# Patient Record
Sex: FEMALE | Race: OTHER | NOT HISPANIC OR LATINO
[De-identification: names, ages, dates, MRNs, and addresses within clinical notes are randomized per-mention and may not be internally consistent; named-entity substitution may affect disease eponyms.]

---

## 2017-02-09 ENCOUNTER — APPOINTMENT (OUTPATIENT)
Dept: OBGYN | Facility: CLINIC | Age: 57
End: 2017-02-09

## 2017-03-31 ENCOUNTER — APPOINTMENT (OUTPATIENT)
Dept: MAMMOGRAPHY | Facility: IMAGING CENTER | Age: 57
End: 2017-03-31

## 2017-03-31 ENCOUNTER — APPOINTMENT (OUTPATIENT)
Dept: ULTRASOUND IMAGING | Facility: IMAGING CENTER | Age: 57
End: 2017-03-31

## 2017-03-31 ENCOUNTER — OUTPATIENT (OUTPATIENT)
Dept: OUTPATIENT SERVICES | Facility: HOSPITAL | Age: 57
LOS: 1 days | End: 2017-03-31
Payer: MEDICAID

## 2017-03-31 ENCOUNTER — APPOINTMENT (OUTPATIENT)
Dept: MRI IMAGING | Facility: IMAGING CENTER | Age: 57
End: 2017-03-31

## 2017-03-31 DIAGNOSIS — Z00.00 ENCOUNTER FOR GENERAL ADULT MEDICAL EXAMINATION WITHOUT ABNORMAL FINDINGS: ICD-10-CM

## 2017-03-31 PROCEDURE — 77066 DX MAMMO INCL CAD BI: CPT

## 2017-03-31 PROCEDURE — C8937: CPT

## 2017-03-31 PROCEDURE — A9585: CPT

## 2017-03-31 PROCEDURE — 76641 ULTRASOUND BREAST COMPLETE: CPT

## 2017-03-31 PROCEDURE — C8908: CPT

## 2017-03-31 PROCEDURE — G0279: CPT

## 2017-04-06 DIAGNOSIS — N60.02 SOLITARY CYST OF LEFT BREAST: ICD-10-CM

## 2017-04-06 DIAGNOSIS — Z80.3 FAMILY HISTORY OF MALIGNANT NEOPLASM OF BREAST: ICD-10-CM

## 2017-04-06 DIAGNOSIS — N63 UNSPECIFIED LUMP IN BREAST: ICD-10-CM

## 2017-04-06 DIAGNOSIS — R92.1 MAMMOGRAPHIC CALCIFICATION FOUND ON DIAGNOSTIC IMAGING OF BREAST: ICD-10-CM

## 2017-04-06 DIAGNOSIS — C50.911 MALIGNANT NEOPLASM OF UNSPECIFIED SITE OF RIGHT FEMALE BREAST: ICD-10-CM

## 2017-04-13 ENCOUNTER — RESULT REVIEW (OUTPATIENT)
Age: 57
End: 2017-04-13

## 2017-04-14 ENCOUNTER — APPOINTMENT (OUTPATIENT)
Dept: MRI IMAGING | Facility: IMAGING CENTER | Age: 57
End: 2017-04-14

## 2017-04-14 ENCOUNTER — OUTPATIENT (OUTPATIENT)
Dept: OUTPATIENT SERVICES | Facility: HOSPITAL | Age: 57
LOS: 1 days | End: 2017-04-14
Payer: MEDICAID

## 2017-04-14 DIAGNOSIS — Z00.8 ENCOUNTER FOR OTHER GENERAL EXAMINATION: ICD-10-CM

## 2017-04-14 PROCEDURE — 88305 TISSUE EXAM BY PATHOLOGIST: CPT

## 2017-04-14 PROCEDURE — A9585: CPT

## 2017-04-14 PROCEDURE — 88360 TUMOR IMMUNOHISTOCHEM/MANUAL: CPT

## 2017-04-14 PROCEDURE — 77065 DX MAMMO INCL CAD UNI: CPT

## 2017-04-14 PROCEDURE — 19085 BX BREAST 1ST LESION MR IMAG: CPT

## 2017-04-17 LAB — SURGICAL PATHOLOGY STUDY: SIGNIFICANT CHANGE UP

## 2017-05-18 ENCOUNTER — APPOINTMENT (OUTPATIENT)
Dept: MAMMOGRAPHY | Facility: IMAGING CENTER | Age: 57
End: 2017-05-18

## 2017-05-18 ENCOUNTER — OUTPATIENT (OUTPATIENT)
Dept: OUTPATIENT SERVICES | Facility: HOSPITAL | Age: 57
LOS: 1 days | End: 2017-05-18
Payer: MEDICAID

## 2017-05-18 VITALS
HEIGHT: 65 IN | TEMPERATURE: 98 F | WEIGHT: 132.94 LBS | DIASTOLIC BLOOD PRESSURE: 61 MMHG | OXYGEN SATURATION: 100 % | HEART RATE: 60 BPM | SYSTOLIC BLOOD PRESSURE: 119 MMHG | RESPIRATION RATE: 16 BRPM

## 2017-05-18 DIAGNOSIS — Z98.890 OTHER SPECIFIED POSTPROCEDURAL STATES: Chronic | ICD-10-CM

## 2017-05-18 DIAGNOSIS — C50.919 MALIGNANT NEOPLASM OF UNSPECIFIED SITE OF UNSPECIFIED FEMALE BREAST: ICD-10-CM

## 2017-05-18 DIAGNOSIS — Z00.8 ENCOUNTER FOR OTHER GENERAL EXAMINATION: ICD-10-CM

## 2017-05-18 DIAGNOSIS — C50.911 MALIGNANT NEOPLASM OF UNSPECIFIED SITE OF RIGHT FEMALE BREAST: ICD-10-CM

## 2017-05-18 DIAGNOSIS — Z01.818 ENCOUNTER FOR OTHER PREPROCEDURAL EXAMINATION: ICD-10-CM

## 2017-05-18 PROCEDURE — 85610 PROTHROMBIN TIME: CPT

## 2017-05-18 PROCEDURE — C1739: CPT

## 2017-05-18 PROCEDURE — 19281 PERQ DEVICE BREAST 1ST IMAG: CPT

## 2017-05-18 PROCEDURE — 85027 COMPLETE CBC AUTOMATED: CPT

## 2017-05-18 PROCEDURE — 19282 PERQ DEVICE BREAST EA IMAG: CPT

## 2017-05-18 PROCEDURE — 80053 COMPREHEN METABOLIC PANEL: CPT

## 2017-05-18 NOTE — H&P PST ADULT - PMH
Anemia, unspecified type    Arthritis    BRCA gene mutation negative    Chronic back pain greater than 3 months duration    GERD without esophagitis    Hepatomegaly  dx 1989  monitoring now 'OKAY"  Malignant neoplasm of right female breast, unspecified site of breast  s/p biopsy  Tinnitus aurium, right Anemia, unspecified type    Arthritis    BRCA gene mutation negative    Chronic back pain greater than 3 months duration    Enlarged heart    GERD without esophagitis    Hepatomegaly  dx 1989  monitoring now 'OKAY"  Malignant neoplasm of right female breast, unspecified site of breast  s/p biopsy  Tinnitus aurium, right

## 2017-05-18 NOTE — H&P PST ADULT - FAMILY HISTORY
Mother  Still living? No  Family history of breast cancer, Age at diagnosis: 51-60     Father  Still living? No  Family history of cerebrovascular accident (CVA), Age at diagnosis: 81-90     Aunt  Still living? No  Family history of breast cancer, Age at diagnosis: Age Unknown

## 2017-05-18 NOTE — H&P PST ADULT - NSANTHOSAYNRD_GEN_A_CORE
No. BING screening performed.  STOP BANG Legend: 0-2 = LOW Risk; 3-4 = INTERMEDIATE Risk; 5-8 = HIGH Risk

## 2017-05-24 ENCOUNTER — RESULT REVIEW (OUTPATIENT)
Age: 57
End: 2017-05-24

## 2017-05-24 ENCOUNTER — OUTPATIENT (OUTPATIENT)
Dept: OUTPATIENT SERVICES | Facility: HOSPITAL | Age: 57
LOS: 1 days | End: 2017-05-24
Payer: MEDICAID

## 2017-05-24 ENCOUNTER — APPOINTMENT (OUTPATIENT)
Dept: NUCLEAR MEDICINE | Facility: HOSPITAL | Age: 57
End: 2017-05-24

## 2017-05-24 VITALS
DIASTOLIC BLOOD PRESSURE: 78 MMHG | HEART RATE: 70 BPM | WEIGHT: 132.94 LBS | TEMPERATURE: 99 F | OXYGEN SATURATION: 100 % | RESPIRATION RATE: 16 BRPM | HEIGHT: 65 IN | SYSTOLIC BLOOD PRESSURE: 132 MMHG

## 2017-05-24 VITALS
TEMPERATURE: 98 F | SYSTOLIC BLOOD PRESSURE: 133 MMHG | HEART RATE: 74 BPM | RESPIRATION RATE: 16 BRPM | DIASTOLIC BLOOD PRESSURE: 69 MMHG | OXYGEN SATURATION: 100 %

## 2017-05-24 DIAGNOSIS — Z98.890 OTHER SPECIFIED POSTPROCEDURAL STATES: Chronic | ICD-10-CM

## 2017-05-24 DIAGNOSIS — C50.919 MALIGNANT NEOPLASM OF UNSPECIFIED SITE OF UNSPECIFIED FEMALE BREAST: ICD-10-CM

## 2017-05-24 PROCEDURE — A9541: CPT

## 2017-05-24 PROCEDURE — 88307 TISSUE EXAM BY PATHOLOGIST: CPT | Mod: 26

## 2017-05-24 PROCEDURE — 76098 X-RAY EXAM SURGICAL SPECIMEN: CPT

## 2017-05-24 PROCEDURE — C1889: CPT

## 2017-05-24 PROCEDURE — 19302 P-MASTECTOMY W/LN REMOVAL: CPT | Mod: RT

## 2017-05-24 PROCEDURE — 88307 TISSUE EXAM BY PATHOLOGIST: CPT

## 2017-05-24 PROCEDURE — 76098 X-RAY EXAM SURGICAL SPECIMEN: CPT | Mod: 26

## 2017-05-24 RX ORDER — ACETAMINOPHEN 500 MG
1000 TABLET ORAL ONCE
Qty: 0 | Refills: 0 | Status: DISCONTINUED | OUTPATIENT
Start: 2017-05-24 | End: 2017-06-08

## 2017-05-24 RX ORDER — ONDANSETRON 8 MG/1
4 TABLET, FILM COATED ORAL ONCE
Qty: 0 | Refills: 0 | Status: DISCONTINUED | OUTPATIENT
Start: 2017-05-24 | End: 2017-06-08

## 2017-05-24 RX ORDER — SODIUM CHLORIDE 9 MG/ML
3 INJECTION INTRAMUSCULAR; INTRAVENOUS; SUBCUTANEOUS EVERY 8 HOURS
Qty: 0 | Refills: 0 | Status: DISCONTINUED | OUTPATIENT
Start: 2017-05-24 | End: 2017-05-24

## 2017-05-24 RX ORDER — OXYCODONE HYDROCHLORIDE 5 MG/1
10 TABLET ORAL ONCE
Qty: 0 | Refills: 0 | Status: DISCONTINUED | OUTPATIENT
Start: 2017-05-24 | End: 2017-05-24

## 2017-05-24 RX ORDER — SODIUM CHLORIDE 9 MG/ML
1000 INJECTION, SOLUTION INTRAVENOUS
Qty: 0 | Refills: 0 | Status: DISCONTINUED | OUTPATIENT
Start: 2017-05-24 | End: 2017-06-08

## 2017-05-24 NOTE — ASU PATIENT PROFILE, ADULT - PMH
Anemia, unspecified type    Arthritis    BRCA gene mutation negative    Chronic back pain greater than 3 months duration    Enlarged heart    GERD without esophagitis    Hepatomegaly  dx 1989  monitoring now 'OKAY"  Malignant neoplasm of right female breast, unspecified site of breast  s/p biopsy  Tinnitus aurium, right

## 2017-05-24 NOTE — ASU DISCHARGE PLAN (ADULT/PEDIATRIC). - NOTIFY
Fever greater than 101/Swelling that continues/Bleeding that does not stop/Numbness, color, or temperature change to extremity Bleeding that does not stop/Fever greater than 101/Pain not relieved by Medications/Swelling that continues/Numbness, color, or temperature change to extremity

## 2017-05-24 NOTE — ASU DISCHARGE PLAN (ADULT/PEDIATRIC). - MEDICATION SUMMARY - MEDICATIONS TO TAKE
I will START or STAY ON the medications listed below when I get home from the hospital:    iron  --  by mouth once a day usues liquid form  -- Indication: For Home medication    anastrozole 1 mg oral tablet  -- 1 tab(s) by mouth once a day  -- Indication: For Home medication    Pepcid 20 mg oral tablet  -- 1 tab(s) by mouth twice  -- Indication: For Home medication

## 2017-05-24 NOTE — ASU DISCHARGE PLAN (ADULT/PEDIATRIC). - SPECIAL INSTRUCTIONS
Some bruising is normal and will resolve in coming weeks.  It is also to be expected that there will be some bloody spotting from surgical incision. If you experience this, just place some helder gauze over the area and secure with paper tape.

## 2017-05-30 ENCOUNTER — TRANSCRIPTION ENCOUNTER (OUTPATIENT)
Age: 57
End: 2017-05-30

## 2017-05-30 LAB — SURGICAL PATHOLOGY STUDY: SIGNIFICANT CHANGE UP

## 2017-06-18 ENCOUNTER — OUTPATIENT (OUTPATIENT)
Dept: OUTPATIENT SERVICES | Facility: HOSPITAL | Age: 57
LOS: 1 days | End: 2017-06-18
Payer: MEDICAID

## 2017-06-18 ENCOUNTER — APPOINTMENT (OUTPATIENT)
Dept: MRI IMAGING | Facility: IMAGING CENTER | Age: 57
End: 2017-06-18

## 2017-06-18 DIAGNOSIS — Z98.890 OTHER SPECIFIED POSTPROCEDURAL STATES: Chronic | ICD-10-CM

## 2017-06-18 DIAGNOSIS — Z00.8 ENCOUNTER FOR OTHER GENERAL EXAMINATION: ICD-10-CM

## 2017-06-18 PROCEDURE — C8908: CPT

## 2017-06-18 PROCEDURE — A9585: CPT

## 2017-06-18 PROCEDURE — C8937: CPT

## 2017-06-27 ENCOUNTER — APPOINTMENT (OUTPATIENT)
Dept: MRI IMAGING | Facility: IMAGING CENTER | Age: 57
End: 2017-06-27

## 2017-06-27 ENCOUNTER — APPOINTMENT (OUTPATIENT)
Dept: ULTRASOUND IMAGING | Facility: IMAGING CENTER | Age: 57
End: 2017-06-27

## 2017-06-27 ENCOUNTER — OUTPATIENT (OUTPATIENT)
Dept: OUTPATIENT SERVICES | Facility: HOSPITAL | Age: 57
LOS: 1 days | End: 2017-06-27
Payer: MEDICAID

## 2017-06-27 ENCOUNTER — RESULT REVIEW (OUTPATIENT)
Age: 57
End: 2017-06-27

## 2017-06-27 DIAGNOSIS — Z98.890 OTHER SPECIFIED POSTPROCEDURAL STATES: Chronic | ICD-10-CM

## 2017-06-27 DIAGNOSIS — Z00.8 ENCOUNTER FOR OTHER GENERAL EXAMINATION: ICD-10-CM

## 2017-06-27 PROCEDURE — 77065 DX MAMMO INCL CAD UNI: CPT

## 2017-06-27 PROCEDURE — 88305 TISSUE EXAM BY PATHOLOGIST: CPT

## 2017-06-27 PROCEDURE — G0279: CPT

## 2017-06-27 PROCEDURE — 19085 BX BREAST 1ST LESION MR IMAG: CPT

## 2017-06-27 PROCEDURE — 76642 ULTRASOUND BREAST LIMITED: CPT

## 2017-06-27 PROCEDURE — A9585: CPT

## 2017-06-30 LAB — SURGICAL PATHOLOGY STUDY: SIGNIFICANT CHANGE UP

## 2017-07-05 DIAGNOSIS — R92.8 OTHER ABNORMAL AND INCONCLUSIVE FINDINGS ON DIAGNOSTIC IMAGING OF BREAST: ICD-10-CM

## 2017-07-05 DIAGNOSIS — C50.911 MALIGNANT NEOPLASM OF UNSPECIFIED SITE OF RIGHT FEMALE BREAST: ICD-10-CM

## 2017-07-05 DIAGNOSIS — Z85.3 PERSONAL HISTORY OF MALIGNANT NEOPLASM OF BREAST: ICD-10-CM

## 2017-07-05 DIAGNOSIS — N64.89 OTHER SPECIFIED DISORDERS OF BREAST: ICD-10-CM

## 2017-08-15 ENCOUNTER — OUTPATIENT (OUTPATIENT)
Dept: OUTPATIENT SERVICES | Facility: HOSPITAL | Age: 57
LOS: 1 days | End: 2017-08-15
Payer: MEDICAID

## 2017-08-15 VITALS
HEART RATE: 61 BPM | WEIGHT: 130.95 LBS | HEIGHT: 62 IN | TEMPERATURE: 99 F | DIASTOLIC BLOOD PRESSURE: 79 MMHG | OXYGEN SATURATION: 99 % | SYSTOLIC BLOOD PRESSURE: 123 MMHG | RESPIRATION RATE: 18 BRPM

## 2017-08-15 DIAGNOSIS — D64.9 ANEMIA, UNSPECIFIED: ICD-10-CM

## 2017-08-15 DIAGNOSIS — Z01.818 ENCOUNTER FOR OTHER PREPROCEDURAL EXAMINATION: ICD-10-CM

## 2017-08-15 DIAGNOSIS — Z90.11 ACQUIRED ABSENCE OF RIGHT BREAST AND NIPPLE: Chronic | ICD-10-CM

## 2017-08-15 DIAGNOSIS — Z85.3 PERSONAL HISTORY OF MALIGNANT NEOPLASM OF BREAST: ICD-10-CM

## 2017-08-15 DIAGNOSIS — Z98.890 OTHER SPECIFIED POSTPROCEDURAL STATES: Chronic | ICD-10-CM

## 2017-08-15 LAB
ANION GAP SERPL CALC-SCNC: 12 MMOL/L — SIGNIFICANT CHANGE UP (ref 5–17)
BUN SERPL-MCNC: 15 MG/DL — SIGNIFICANT CHANGE UP (ref 7–23)
CALCIUM SERPL-MCNC: 9.3 MG/DL — SIGNIFICANT CHANGE UP (ref 8.4–10.5)
CHLORIDE SERPL-SCNC: 104 MMOL/L — SIGNIFICANT CHANGE UP (ref 96–108)
CO2 SERPL-SCNC: 26 MMOL/L — SIGNIFICANT CHANGE UP (ref 22–31)
CREAT SERPL-MCNC: 0.7 MG/DL — SIGNIFICANT CHANGE UP (ref 0.5–1.3)
GLUCOSE SERPL-MCNC: 86 MG/DL — SIGNIFICANT CHANGE UP (ref 70–99)
HCT VFR BLD CALC: 31 % — LOW (ref 34.5–45)
HGB BLD-MCNC: 9.8 G/DL — LOW (ref 11.5–15.5)
MCHC RBC-ENTMCNC: 27.8 PG — SIGNIFICANT CHANGE UP (ref 27–34)
MCHC RBC-ENTMCNC: 31.6 GM/DL — LOW (ref 32–36)
MCV RBC AUTO: 88.1 FL — SIGNIFICANT CHANGE UP (ref 80–100)
PLATELET # BLD AUTO: 200 K/UL — SIGNIFICANT CHANGE UP (ref 150–400)
POTASSIUM SERPL-MCNC: 3.9 MMOL/L — SIGNIFICANT CHANGE UP (ref 3.5–5.3)
POTASSIUM SERPL-SCNC: 3.9 MMOL/L — SIGNIFICANT CHANGE UP (ref 3.5–5.3)
RBC # BLD: 3.52 M/UL — LOW (ref 3.8–5.2)
RBC # FLD: 12.7 % — SIGNIFICANT CHANGE UP (ref 10.3–14.5)
SODIUM SERPL-SCNC: 142 MMOL/L — SIGNIFICANT CHANGE UP (ref 135–145)
WBC # BLD: 4.64 K/UL — SIGNIFICANT CHANGE UP (ref 3.8–10.5)
WBC # FLD AUTO: 4.64 K/UL — SIGNIFICANT CHANGE UP (ref 3.8–10.5)

## 2017-08-15 PROCEDURE — 80048 BASIC METABOLIC PNL TOTAL CA: CPT

## 2017-08-15 PROCEDURE — 85027 COMPLETE CBC AUTOMATED: CPT

## 2017-08-15 PROCEDURE — G0463: CPT

## 2017-08-15 RX ORDER — LIDOCAINE HCL 20 MG/ML
0.2 VIAL (ML) INJECTION ONCE
Qty: 0 | Refills: 0 | Status: DISCONTINUED | OUTPATIENT
Start: 2017-08-25 | End: 2017-08-25

## 2017-08-15 RX ORDER — SODIUM CHLORIDE 9 MG/ML
3 INJECTION INTRAMUSCULAR; INTRAVENOUS; SUBCUTANEOUS EVERY 8 HOURS
Qty: 0 | Refills: 0 | Status: DISCONTINUED | OUTPATIENT
Start: 2017-08-25 | End: 2017-08-25

## 2017-08-15 NOTE — H&P PST ADULT - MALLAMPATI CLASS
Class III - visualization of the soft palate and the base of the uvula 13 inch neck/Class II - visualization of the soft palate, fauces, and uvula

## 2017-08-15 NOTE — H&P PST ADULT - PSH
S/P biopsy  right breast 2017  S/P endoscopy  many years ago  S/P right mastectomy  5/2017 S/P biopsy  right breast 2017  S/P endoscopy  many years ago  S/P lumpectomy, right breast  5/2017

## 2017-08-15 NOTE — H&P PST ADULT - HISTORY OF PRESENT ILLNESS
56 year old female with 56 year old female s/p right lumpectomy for breast CA in May/2017 .Pt reports that s/p recent mammogram F/U with MRI shows 6mm Non mass enhancement  in upper outer left breast. Now coming in PST  for scheduled  Bilateral simple mastectomies, Left Breast sentinel  Lymph node biopsy, Bilateral Breast Reconstruction with expanders on 8/25/2017.

## 2017-08-15 NOTE — H&P PST ADULT - PMH
Anemia, unspecified type    Arthritis    BRCA gene mutation negative    Chronic back pain greater than 3 months duration    Enlarged heart    GERD without esophagitis    Hepatomegaly  dx 1989  monitoring now 'OKAY"  Malignant neoplasm of right female breast, unspecified site of breast  s/p biopsy  Tinnitus aurium, right Anemia, unspecified type    Arthritis    BRCA gene mutation negative    Enlarged heart    GERD without esophagitis    Hepatomegaly  dx 1989  monitoring now 'OKAY"  Malignant neoplasm of right female breast, unspecified site of breast  s/p lumpectomy ( 5/ 2017 )  Migraine    Personal history of malignant neoplasm of breast    Tinnitus aurium, right

## 2017-08-16 RX ORDER — CEFAZOLIN SODIUM 1 G
2000 VIAL (EA) INJECTION ONCE
Qty: 0 | Refills: 0 | Status: DISCONTINUED | OUTPATIENT
Start: 2017-08-25 | End: 2017-08-25

## 2017-08-25 ENCOUNTER — RESULT REVIEW (OUTPATIENT)
Age: 57
End: 2017-08-25

## 2017-08-25 ENCOUNTER — INPATIENT (INPATIENT)
Facility: HOSPITAL | Age: 57
LOS: 1 days | Discharge: HOME HEALTH | DRG: 581 | End: 2017-08-27
Attending: SPECIALIST | Admitting: SPECIALIST
Payer: MEDICAID

## 2017-08-25 ENCOUNTER — APPOINTMENT (OUTPATIENT)
Dept: NUCLEAR MEDICINE | Facility: HOSPITAL | Age: 57
End: 2017-08-25

## 2017-08-25 VITALS
DIASTOLIC BLOOD PRESSURE: 84 MMHG | WEIGHT: 130.95 LBS | HEIGHT: 63 IN | HEART RATE: 70 BPM | OXYGEN SATURATION: 100 % | RESPIRATION RATE: 18 BRPM | TEMPERATURE: 98 F | SYSTOLIC BLOOD PRESSURE: 131 MMHG

## 2017-08-25 DIAGNOSIS — Z98.890 OTHER SPECIFIED POSTPROCEDURAL STATES: Chronic | ICD-10-CM

## 2017-08-25 DIAGNOSIS — Z90.11 ACQUIRED ABSENCE OF RIGHT BREAST AND NIPPLE: Chronic | ICD-10-CM

## 2017-08-25 DIAGNOSIS — Z85.3 PERSONAL HISTORY OF MALIGNANT NEOPLASM OF BREAST: ICD-10-CM

## 2017-08-25 LAB
BLD GP AB SCN SERPL QL: NEGATIVE — SIGNIFICANT CHANGE UP
HCG UR QL: NEGATIVE — SIGNIFICANT CHANGE UP
RH IG SCN BLD-IMP: POSITIVE — SIGNIFICANT CHANGE UP
RH IG SCN BLD-IMP: POSITIVE — SIGNIFICANT CHANGE UP

## 2017-08-25 PROCEDURE — 88331 PATH CONSLTJ SURG 1 BLK 1SPC: CPT | Mod: 26

## 2017-08-25 PROCEDURE — 88342 IMHCHEM/IMCYTCHM 1ST ANTB: CPT | Mod: 26

## 2017-08-25 PROCEDURE — 88307 TISSUE EXAM BY PATHOLOGIST: CPT | Mod: 26

## 2017-08-25 RX ORDER — SODIUM CHLORIDE 9 MG/ML
1000 INJECTION, SOLUTION INTRAVENOUS
Qty: 0 | Refills: 0 | Status: DISCONTINUED | OUTPATIENT
Start: 2017-08-25 | End: 2017-08-26

## 2017-08-25 RX ORDER — ACETAMINOPHEN 500 MG
650 TABLET ORAL EVERY 6 HOURS
Qty: 0 | Refills: 0 | Status: DISCONTINUED | OUTPATIENT
Start: 2017-08-25 | End: 2017-08-27

## 2017-08-25 RX ORDER — HYDROMORPHONE HYDROCHLORIDE 2 MG/ML
0.5 INJECTION INTRAMUSCULAR; INTRAVENOUS; SUBCUTANEOUS
Qty: 0 | Refills: 0 | Status: DISCONTINUED | OUTPATIENT
Start: 2017-08-25 | End: 2017-08-25

## 2017-08-25 RX ORDER — FAMOTIDINE 10 MG/ML
1 INJECTION INTRAVENOUS
Qty: 0 | Refills: 0 | COMMUNITY

## 2017-08-25 RX ADMIN — Medication 100 MILLIGRAM(S): at 18:04

## 2017-08-25 RX ADMIN — SODIUM CHLORIDE 75 MILLILITER(S): 9 INJECTION, SOLUTION INTRAVENOUS at 14:25

## 2017-08-25 RX ADMIN — Medication 100 MILLIGRAM(S): at 23:25

## 2017-08-25 NOTE — PROGRESS NOTE ADULT - ASSESSMENT
Assessment: 56F with history of malignant neoplasm of R breast s/p bilateral mastectomy with left sentinel lymph node biopsy, currently stable in PACU.    Plan:  + monitoring in PACU  + advance diet as tolerated  + ambulation  + pain control  + dvt ppx

## 2017-08-25 NOTE — PROGRESS NOTE ADULT - ASSESSMENT
Assessment:56yFemaleHPI:   S/P b/l mastectomy with L sentinel lymph node biopsy and tissue expanders   POD# 0    Plan:  Clindamycin perioperative  Reg diet  OOB/Ambulate  GIA drain teaching

## 2017-08-25 NOTE — BRIEF OPERATIVE NOTE - OPERATION/FINDINGS
Patient underwent bilateral mastectomy with left sentinel lymph node biopsy and tissue expansion by Plastic.

## 2017-08-26 ENCOUNTER — TRANSCRIPTION ENCOUNTER (OUTPATIENT)
Age: 57
End: 2017-08-26

## 2017-08-26 LAB
ANION GAP SERPL CALC-SCNC: 12 MMOL/L — SIGNIFICANT CHANGE UP (ref 5–17)
BUN SERPL-MCNC: 9 MG/DL — SIGNIFICANT CHANGE UP (ref 7–23)
CALCIUM SERPL-MCNC: 8.9 MG/DL — SIGNIFICANT CHANGE UP (ref 8.4–10.5)
CHLORIDE SERPL-SCNC: 105 MMOL/L — SIGNIFICANT CHANGE UP (ref 96–108)
CO2 SERPL-SCNC: 24 MMOL/L — SIGNIFICANT CHANGE UP (ref 22–31)
CREAT SERPL-MCNC: 0.56 MG/DL — SIGNIFICANT CHANGE UP (ref 0.5–1.3)
GLUCOSE SERPL-MCNC: 95 MG/DL — SIGNIFICANT CHANGE UP (ref 70–99)
HCT VFR BLD CALC: 30.1 % — LOW (ref 34.5–45)
HGB BLD-MCNC: 10 G/DL — LOW (ref 11.5–15.5)
MCHC RBC-ENTMCNC: 29.9 PG — SIGNIFICANT CHANGE UP (ref 27–34)
MCHC RBC-ENTMCNC: 33.2 GM/DL — SIGNIFICANT CHANGE UP (ref 32–36)
MCV RBC AUTO: 90.3 FL — SIGNIFICANT CHANGE UP (ref 80–100)
PLATELET # BLD AUTO: 178 K/UL — SIGNIFICANT CHANGE UP (ref 150–400)
POTASSIUM SERPL-MCNC: 3.2 MMOL/L — LOW (ref 3.5–5.3)
POTASSIUM SERPL-SCNC: 3.2 MMOL/L — LOW (ref 3.5–5.3)
RBC # BLD: 3.34 M/UL — LOW (ref 3.8–5.2)
RBC # FLD: 11.6 % — SIGNIFICANT CHANGE UP (ref 10.3–14.5)
SODIUM SERPL-SCNC: 141 MMOL/L — SIGNIFICANT CHANGE UP (ref 135–145)
WBC # BLD: 10.7 K/UL — HIGH (ref 3.8–10.5)
WBC # FLD AUTO: 10.7 K/UL — HIGH (ref 3.8–10.5)

## 2017-08-26 RX ORDER — POTASSIUM CHLORIDE 20 MEQ
20 PACKET (EA) ORAL
Qty: 0 | Refills: 0 | Status: COMPLETED | OUTPATIENT
Start: 2017-08-26 | End: 2017-08-26

## 2017-08-26 RX ADMIN — Medication 100 MILLIGRAM(S): at 08:38

## 2017-08-26 RX ADMIN — Medication 20 MILLIEQUIVALENT(S): at 17:16

## 2017-08-26 RX ADMIN — Medication 100 MILLIGRAM(S): at 13:56

## 2017-08-26 RX ADMIN — Medication 100 MILLIGRAM(S): at 21:30

## 2017-08-26 NOTE — PROGRESS NOTE ADULT - ASSESSMENT
Assessment:56yFemaleHPI:  POD 1 S/P b/l mastectomy with L sentinel lymph node biopsy and tissue expanders    Plan:  Clindamycin perioperative  Reg diet  OOB/Ambulate  GIA drain teaching

## 2017-08-26 NOTE — PROVIDER CONTACT NOTE (OTHER) - SITUATION
pt refusing potassium supplementation. pt educated on importance of potassium and risks associated with hypokalemia.

## 2017-08-26 NOTE — PROGRESS NOTE ADULT - ASSESSMENT
Patient is a 56y old Female s/p B/l Mx, B/l TE  - D/c home today per primary team  - Continue drain care  - Pain control  - C/w Abx  - IS

## 2017-08-27 ENCOUNTER — TRANSCRIPTION ENCOUNTER (OUTPATIENT)
Age: 57
End: 2017-08-27

## 2017-08-27 VITALS
RESPIRATION RATE: 18 BRPM | SYSTOLIC BLOOD PRESSURE: 122 MMHG | DIASTOLIC BLOOD PRESSURE: 71 MMHG | OXYGEN SATURATION: 98 % | TEMPERATURE: 99 F | HEART RATE: 79 BPM

## 2017-08-27 PROCEDURE — C1789: CPT

## 2017-08-27 PROCEDURE — 88331 PATH CONSLTJ SURG 1 BLK 1SPC: CPT

## 2017-08-27 PROCEDURE — 86901 BLOOD TYPING SEROLOGIC RH(D): CPT

## 2017-08-27 PROCEDURE — 81025 URINE PREGNANCY TEST: CPT

## 2017-08-27 PROCEDURE — A9541: CPT

## 2017-08-27 PROCEDURE — 88341 IMHCHEM/IMCYTCHM EA ADD ANTB: CPT

## 2017-08-27 PROCEDURE — 80048 BASIC METABOLIC PNL TOTAL CA: CPT

## 2017-08-27 PROCEDURE — 88307 TISSUE EXAM BY PATHOLOGIST: CPT

## 2017-08-27 PROCEDURE — 86850 RBC ANTIBODY SCREEN: CPT

## 2017-08-27 PROCEDURE — 86900 BLOOD TYPING SEROLOGIC ABO: CPT

## 2017-08-27 PROCEDURE — 85027 COMPLETE CBC AUTOMATED: CPT

## 2017-08-27 RX ADMIN — Medication 100 MILLIGRAM(S): at 05:26

## 2017-08-27 NOTE — PROGRESS NOTE ADULT - ASSESSMENT
56yFemale POD 2 S/P b/l mastectomy with L sentinel lymph node biopsy and tissue expanders    - Clindamycin perioperative  - Reg diet  - OOB/Ambulate  - GIA drain teaching  - d/c today

## 2017-08-27 NOTE — DISCHARGE NOTE ADULT - CARE PLAN
Principal Discharge DX:	Malignant neoplasm of right female breast, unspecified site of breast  Goal:	healing from mastectomy and tissue expander surgery  Instructions for follow-up, activity and diet:	You can have a normal diet and participate in activity as tolerated. Do not drive if you are on narcotics. Your pain medication and antibiotics have been sent to your pharmacy by Dr. Gordillo. You should Principal Discharge DX:	Malignant neoplasm of right female breast, unspecified site of breast  Goal:	healing from mastectomy and tissue expander surgery  Instructions for follow-up, activity and diet:	You can have a normal diet and participate in activity as tolerated. Do not drive if you are on narcotics. Your pain medication and antibiotics have been sent to your pharmacy by Dr. Gordillo. You should call his office and Dr. Aguilar's office to schedule a follow-up appointment in 1-2 weeks. Please call the office if you see a great increase in your drain output, if your drain output starts to smell or looks like pus, if you have a fever >101F, if your pain does not go away with pain medication, or if you have any other concerns.

## 2017-08-27 NOTE — PROGRESS NOTE ADULT - ASSESSMENT
A/P: 56F s/p b/l Mx, b/l recon w/ Andre (POD2  - OK to d/c home (Pt has scripts already for Duricef 500mg BID and percocet)  - GIA care  - C/w bra and dressings  - Pain control  - IS  - Ambulate  - DVT prophylaxis   - f/u w/ Dr. Gordillo this week A/P: 56F s/p b/l Mx, b/l recon w/ Andre (POD2  - OK to d/c home (Pt needs Rx for clinda and percocet)  - GIA care  - C/w bra and dressings  - Pain control  - IS  - Ambulate  - DVT prophylaxis   - f/u w/ Dr. Gordillo this week

## 2017-08-27 NOTE — DISCHARGE NOTE ADULT - HOSPITAL COURSE
The patient is a 56 year old female s/p right lumpectomy for breast CA in May 2017 who had a post-operative MRI that showed a 6mm enhancement in the upper outer left breast. She presented on 8/25 for scheduled bilateral simple mastectomies, a left sentinel lymph node biopsy, and bilateral breast reconstruction with expanders. She tolerated the surgery well and had some nausea post-op that has resolved. On POD 1, she was tolerating her diet well and she was ambulating. On POD 2, she was deemed safe for discharge home with follow-up with Dr. Gordillo and Dr. Aguilar in 1-2 weeks. She will get  teaching and will have VNS come see her at home.

## 2017-08-27 NOTE — DISCHARGE NOTE ADULT - HOME CARE AGENCY
[facility]     scheduled for  Start of care   8/28/17   cassie Ricecment of  [facility] care.    Agency  will contact you to set up time of visit.  IF  you have  any questions to same  you  may contact agency.

## 2017-08-27 NOTE — DISCHARGE NOTE ADULT - MEDICATION SUMMARY - MEDICATIONS TO TAKE
I will START or STAY ON the medications listed below when I get home from the hospital:    tamoxifen 20 mg oral tablet  -- 1 tab(s) by mouth once a day  -- Indication: For breast cancer I will START or STAY ON the medications listed below when I get home from the hospital:    Percocet 5/325 oral tablet  -- 1 tab(s) by mouth every 4 to 6 hours MDD:6    For moderate to severe pain  -- Caution federal law prohibits the transfer of this drug to any person other  than the person for whom it was prescribed.  May cause drowsiness.  Alcohol may intensify this effect.  Use care when operating dangerous machinery.  This prescription cannot be refilled.  This product contains acetaminophen.  Do not use  with any other product containing acetaminophen to prevent possible liver damage.  Using more of this medication than prescribed may cause serious breathing problems.    -- Indication: For Pain control    tamoxifen 20 mg oral tablet  -- 1 tab(s) by mouth once a day  -- Indication: For breast cancer    clindamycin 300 mg oral capsule  -- 1 cap(s) by mouth every 6 hours  Start first dose 8/28  -- Finish all this medication unless otherwise directed by prescriber.  Medication should be taken with plenty of water.    -- Indication: For Antibiotic

## 2017-08-27 NOTE — DISCHARGE NOTE ADULT - NS AS ACTIVITY OBS
Return to Work/School allowed/Sex allowed/Walking-Indoors allowed/Stairs allowed/Walking-Outdoors allowed/Do not drive while taking pain medication./No Heavy lifting/straining/Showering allowed

## 2017-08-27 NOTE — DISCHARGE NOTE ADULT - PLAN OF CARE
healing from mastectomy and tissue expander surgery You can have a normal diet and participate in activity as tolerated. Do not drive if you are on narcotics. Your pain medication and antibiotics have been sent to your pharmacy by Dr. Gordillo. You should You can have a normal diet and participate in activity as tolerated. Do not drive if you are on narcotics. Your pain medication and antibiotics have been sent to your pharmacy by Dr. Gordillo. You should call his office and Dr. Aguilar's office to schedule a follow-up appointment in 1-2 weeks. Please call the office if you see a great increase in your drain output, if your drain output starts to smell or looks like pus, if you have a fever >101F, if your pain does not go away with pain medication, or if you have any other concerns.

## 2017-08-27 NOTE — DISCHARGE NOTE ADULT - ADDITIONAL INSTRUCTIONS
Please care for your GIA drains as you have been taught to. Record the output for each one and what the fluid looks like on a daily basis. If you see any concerning changed in the bulb fluid, please call the office.    Follow-up with Breast Surgeon, Dr. Aguilar in 1-2 weeks.    Follow-up with Plastic Surgeon, Dr. Gordillo in 1-2 weeks.

## 2017-08-27 NOTE — PROGRESS NOTE ADULT - SUBJECTIVE AND OBJECTIVE BOX
Brief Operative Note    Attending: Marisel    Resident:  Yolnade Samuels    Preoperative Diagnosis: History of malignant neoplasm of Right breast        Postoperative Diagnosis: same    Procedure: Bilateral Mastectomy with Left sentinel lymph node biopsy    Findings: as dictated    EBL: 50 cc    Complications: none    Condition upon return to PACU: stable.   Will be transferred from PACU to the floor.
GREEN SURGERY DAILY PROGRESS NOTE:    S: Patient is s/p bilateral mastectomy. She had no acute issues overnight.    O:  Vital Signs Last 24 Hrs  T(C): 36.9 (26 Aug 2017 06:20), Max: 37 (26 Aug 2017 01:49)  T(F): 98.5 (26 Aug 2017 06:20), Max: 98.6 (26 Aug 2017 01:49)  HR: 72 (26 Aug 2017 06:20) (63 - 90)  BP: 146/70 (26 Aug 2017 06:20) (119/71 - 146/70)  BP(mean): 95 (25 Aug 2017 14:30) (95 - 95)  RR: 18 (26 Aug 2017 06:20) (14 - 19)  SpO2: 98% (26 Aug 2017 06:20) (97% - 100%)    I&O's Detail    25 Aug 2017 07:01  -  26 Aug 2017 07:00  --------------------------------------------------------  IN:    IV PiggyBack: 50 mL    lactated ringers.: 525 mL    Oral Fluid: 180 mL  Total IN: 755 mL    OUT:    Bulb: 40 mL    Bulb: 135 mL    Bulb: 15 mL    Bulb: 170 mL    Voided: 1925 mL  Total OUT: 2285 mL    Total NET: -1530 mL    Physical Exam:  General: NAD, lying in bed  Pulmonary: Nonlabored breathing, no respiratory distress  Chest: surgical bra on, xeroform over mastectomy incisions, 2 GIA bulbs from each side of chest- 1 on each side with majority sanguinous fluid    GIA 1- 15cc  GIA 2- 170cc  GIA 3- 40  GIA 4- 135cc
GREEN SURGERY POST-OP CHECK:    S: Patient is s/p bilateral mastectomy, L sentinel lymph node biopsy, and placement of tissue expanders. She reports some nausea, but does not want to take medications. She has not had any vomiting. She is negative for flatus and for bowel movements. She has been ambulating, but has been unable to take in much PO.    O:  Vital Signs Last 24 Hrs  T(C): 36.7 (25 Aug 2017 16:45), Max: 36.8 (25 Aug 2017 06:15)  T(F): 98 (25 Aug 2017 16:45), Max: 98.2 (25 Aug 2017 06:15)  HR: 72 (25 Aug 2017 16:45) (67 - 90)  BP: 119/71 (25 Aug 2017 16:45) (119/71 - 140/66)  BP(mean): 95 (25 Aug 2017 14:30) (95 - 95)  RR: 18 (25 Aug 2017 16:45) (14 - 19)  SpO2: 98% (25 Aug 2017 16:45) (98% - 100%)  I&O's Detail    25 Aug 2017 07:01  -  25 Aug 2017 17:55  --------------------------------------------------------  IN:    lactated ringers.: 450 mL  Total IN: 450 mL    OUT:    Bulb: 20 mL    Bulb: 40 mL    Voided: 375 mL  Total OUT: 435 mL    Total NET: 15 mL    clindamycin IVPB 600  clindamycin IVPB 600    PAST MEDICAL & SURGICAL HISTORY:  Migraine  Personal history of malignant neoplasm of breast  Enlarged heart  GERD without esophagitis  Tinnitus aurium, right  Arthritis  BRCA gene mutation negative  Anemia, unspecified type  Hepatomegaly: dx 1989  monitoring now &#x27;OKAY&quot;  Malignant neoplasm of right female breast, unspecified site of breast: s/p lumpectomy ( 5/ 2017 )  S/P lumpectomy, right breast: 5/2017  S/P endoscopy: many years ago  S/P biopsy: right breast 2017    Physical Exam:  General: NAD, sitting in chair  Pulmonary: Nonlabored breathing, no respiratory distress  Chest: surgical bra on, xeroform over mastectomy incisions, 2 GIA bulbs from each side of chest- 1 on each side with majority sanguinous fluid
GREEN SURGERY PROGRESS NOTE    POST OPERATIVE DAY #: 2    PROCEDURE: Bilateral mastectomy    SUBJECTIVE: Potassium repleated o/n.    Vital Signs Last 24 Hrs  T(C): 37.4 (27 Aug 2017 05:01), Max: 37.6 (26 Aug 2017 16:38)  T(F): 99.3 (27 Aug 2017 05:01), Max: 99.6 (26 Aug 2017 16:38)  HR: 79 (27 Aug 2017 05:01) (68 - 86)  BP: 122/71 (27 Aug 2017 05:01) (117/71 - 157/68)  BP(mean): --  RR: 18 (27 Aug 2017 05:01) (18 - 18)  SpO2: 98% (27 Aug 2017 05:01) (98% - 100%)    Physical Exam:  General: NAD, lying in bed  Pulmonary: Nonlabored breathing, no respiratory distress  Chest: surgical bra on, xeroform over mastectomy incisions, 2 GIA bulbs from each side of chest- 1 on each side with majority sanguinous fluid  Extremities: Grossly symmetric    I&O's Summary    25 Aug 2017 07:01  -  26 Aug 2017 07:00  --------------------------------------------------------  IN: 755 mL / OUT: 2285 mL / NET: -1530 mL    26 Aug 2017 07:01  -  27 Aug 2017 06:54  --------------------------------------------------------  IN: 440 mL / OUT: 1168 mL / NET: -728 mL      I&O's Detail    25 Aug 2017 07:01  -  26 Aug 2017 07:00  --------------------------------------------------------  IN:    IV PiggyBack: 50 mL    lactated ringers.: 525 mL    Oral Fluid: 180 mL  Total IN: 755 mL    OUT:    Bulb: 40 mL    Bulb: 135 mL    Bulb: 15 mL    Bulb: 170 mL    Voided: 1925 mL  Total OUT: 2285 mL    Total NET: -1530 mL      26 Aug 2017 07:01  -  27 Aug 2017 06:54  --------------------------------------------------------  IN:    IV PiggyBack: 100 mL    Oral Fluid: 340 mL  Total IN: 440 mL    OUT:    Bulb: 100 mL    Bulb: 168 mL    Bulb: 15 mL    Bulb: 35 mL    Voided: 850 mL  Total OUT: 1168 mL    Total NET: -728 mL          MEDICATIONS  (STANDING):  clindamycin IVPB 600 milliGRAM(s) IV Intermittent every 8 hours    MEDICATIONS  (PRN):  acetaminophen   Tablet. 650 milliGRAM(s) Oral every 6 hours PRN Moderate Pain (4 - 6)      LABS:                        10.0   10.7  )-----------( 178      ( 26 Aug 2017 08:03 )             30.1     08-26    141  |  105  |  9   ----------------------------<  95  3.2<L>   |  24  |  0.56    Ca    8.9      26 Aug 2017 08:03            RADIOLOGY & ADDITIONAL STUDIES:
Plastic Surgery Progress Note    S: Patient seen and examined. Patient is comfortable and in no apparent distress. Pain is controlled    Vital Signs Last 24 Hrs  T(C): 36.9 (26 Aug 2017 06:20), Max: 37 (26 Aug 2017 01:49)  T(F): 98.5 (26 Aug 2017 06:20), Max: 98.6 (26 Aug 2017 01:49)  HR: 72 (26 Aug 2017 06:20) (63 - 90)  BP: 146/70 (26 Aug 2017 06:20) (119/71 - 146/70)  BP(mean): 95 (25 Aug 2017 14:30) (95 - 95)  RR: 18 (26 Aug 2017 06:20) (14 - 19)  SpO2: 98% (26 Aug 2017 06:20) (97% - 100%)  I&O's Detail    25 Aug 2017 07:01  -  26 Aug 2017 07:00  --------------------------------------------------------  IN:    IV PiggyBack: 50 mL    lactated ringers.: 525 mL    Oral Fluid: 180 mL  Total IN: 755 mL    OUT:    Bulb: 40 mL    Bulb: 135 mL    Bulb: 15 mL    Bulb: 170 mL    Voided: 1925 mL  Total OUT: 2285 mL    Total NET: -1530 mL          Physical Exam:  General: Awake and alert, NAD  GIA's serosanguinous. Breast soft and viable. Mild erythema, no edema or pus.
Plastic Surgery Progress Note (p. 71581)    SUBJECTIVE:  Doing well. No overnight events. Pain well controlled. No N/V.     OBJECTIVE:     ** VITAL SIGNS / I&O's **    Vital Signs Last 24 Hrs  T(C): 37.4 (27 Aug 2017 05:01), Max: 37.6 (26 Aug 2017 16:38)  T(F): 99.3 (27 Aug 2017 05:01), Max: 99.6 (26 Aug 2017 16:38)  HR: 79 (27 Aug 2017 05:01) (68 - 86)  BP: 122/71 (27 Aug 2017 05:01) (117/71 - 157/68)  BP(mean): --  RR: 18 (27 Aug 2017 05:01) (18 - 18)  SpO2: 98% (27 Aug 2017 05:01) (98% - 100%)      26 Aug 2017 07:01  -  27 Aug 2017 07:00  --------------------------------------------------------  IN:    IV PiggyBack: 100 mL    Oral Fluid: 340 mL  Total IN: 440 mL    OUT:    Bulb: 100 mL    Bulb: 168 mL    Bulb: 15 mL    Bulb: 35 mL    Voided: 850 mL  Total OUT: 1168 mL    Total NET: -728 mL          ** PHYSICAL EXAM **    -- CONSTITUTIONAL: AOx3. NAD.   -- CHEST: B/L breast dressings c/d/i; JPs serosanguinous      ** LABS **                          10.0   10.7  )-----------( 178      ( 26 Aug 2017 08:03 )             30.1     26 Aug 2017 08:03    141    |  105    |  9      ----------------------------<  95     3.2     |  24     |  0.56     Ca    8.9        26 Aug 2017 08:03        CAPILLARY BLOOD GLUCOSE
Post-Anesthetic Evaluation:    The Patient was interviewed and evaluated    Vital Signs Last 24 Hrs  T(C): 36.9 (26 Aug 2017 06:20), Max: 37 (26 Aug 2017 01:49)  T(F): 98.5 (26 Aug 2017 06:20), Max: 98.6 (26 Aug 2017 01:49)  HR: 72 (26 Aug 2017 06:20) (63 - 90)  BP: 146/70 (26 Aug 2017 06:20) (119/71 - 146/70)  BP(mean): 95 (25 Aug 2017 14:30) (95 - 95)  RR: 18 (26 Aug 2017 06:20) (14 - 19)  SpO2: 98% (26 Aug 2017 06:20) (97% - 100%)    Evaluation:      (X) No apparent complications or complaints regarding anesthesia care at this time  (X) All questions were answered    Condition:  (X) Stable      ( ) Guarded      ( ) Critical    Recommendations:  (X) None     ( ) Other:

## 2017-08-27 NOTE — DISCHARGE NOTE ADULT - CARE PROVIDER_API CALL
Leatha Aguilar), Surgery  [address]  [address]  [place] [address]  Phone: [phone]  Fax: [phone]    Michael Gordillo), Plastic Surgery  [address] [place] [address]  Phone: [phone]  Fax: [phone]

## 2017-08-29 LAB — SURGICAL PATHOLOGY STUDY: SIGNIFICANT CHANGE UP

## 2017-09-08 ENCOUNTER — TRANSCRIPTION ENCOUNTER (OUTPATIENT)
Age: 57
End: 2017-09-08

## 2017-11-22 ENCOUNTER — OUTPATIENT (OUTPATIENT)
Dept: OUTPATIENT SERVICES | Facility: HOSPITAL | Age: 57
LOS: 1 days | End: 2017-11-22
Payer: MEDICAID

## 2017-11-22 VITALS
OXYGEN SATURATION: 96 % | SYSTOLIC BLOOD PRESSURE: 128 MMHG | TEMPERATURE: 98 F | HEART RATE: 71 BPM | WEIGHT: 134.92 LBS | HEIGHT: 64 IN | RESPIRATION RATE: 17 BRPM | DIASTOLIC BLOOD PRESSURE: 84 MMHG

## 2017-11-22 DIAGNOSIS — Z90.11 ACQUIRED ABSENCE OF RIGHT BREAST AND NIPPLE: Chronic | ICD-10-CM

## 2017-11-22 DIAGNOSIS — Z85.3 PERSONAL HISTORY OF MALIGNANT NEOPLASM OF BREAST: ICD-10-CM

## 2017-11-22 DIAGNOSIS — Z90.13 ACQUIRED ABSENCE OF BILATERAL BREASTS AND NIPPLES: Chronic | ICD-10-CM

## 2017-11-22 DIAGNOSIS — Z98.890 OTHER SPECIFIED POSTPROCEDURAL STATES: Chronic | ICD-10-CM

## 2017-11-22 DIAGNOSIS — Z01.818 ENCOUNTER FOR OTHER PREPROCEDURAL EXAMINATION: ICD-10-CM

## 2017-11-22 LAB
ALBUMIN SERPL ELPH-MCNC: 4 G/DL — SIGNIFICANT CHANGE UP (ref 3.3–5)
ALP SERPL-CCNC: 57 U/L — SIGNIFICANT CHANGE UP (ref 40–120)
ALT FLD-CCNC: <4 U/L — LOW (ref 10–45)
ANION GAP SERPL CALC-SCNC: 13 MMOL/L — SIGNIFICANT CHANGE UP (ref 5–17)
AST SERPL-CCNC: 20 U/L — SIGNIFICANT CHANGE UP (ref 10–40)
BILIRUB SERPL-MCNC: 0.2 MG/DL — SIGNIFICANT CHANGE UP (ref 0.2–1.2)
BUN SERPL-MCNC: 14 MG/DL — SIGNIFICANT CHANGE UP (ref 7–23)
CALCIUM SERPL-MCNC: 9.7 MG/DL — SIGNIFICANT CHANGE UP (ref 8.4–10.5)
CHLORIDE SERPL-SCNC: 102 MMOL/L — SIGNIFICANT CHANGE UP (ref 96–108)
CO2 SERPL-SCNC: 25 MMOL/L — SIGNIFICANT CHANGE UP (ref 22–31)
CREAT SERPL-MCNC: 0.68 MG/DL — SIGNIFICANT CHANGE UP (ref 0.5–1.3)
GLUCOSE SERPL-MCNC: 81 MG/DL — SIGNIFICANT CHANGE UP (ref 70–99)
HCT VFR BLD CALC: 31.3 % — LOW (ref 34.5–45)
HGB BLD-MCNC: 9.9 G/DL — LOW (ref 11.5–15.5)
MCHC RBC-ENTMCNC: 27.1 PG — SIGNIFICANT CHANGE UP (ref 27–34)
MCHC RBC-ENTMCNC: 31.6 GM/DL — LOW (ref 32–36)
MCV RBC AUTO: 85.8 FL — SIGNIFICANT CHANGE UP (ref 80–100)
PLATELET # BLD AUTO: 293 K/UL — SIGNIFICANT CHANGE UP (ref 150–400)
POTASSIUM SERPL-MCNC: 4.1 MMOL/L — SIGNIFICANT CHANGE UP (ref 3.5–5.3)
POTASSIUM SERPL-SCNC: 4.1 MMOL/L — SIGNIFICANT CHANGE UP (ref 3.5–5.3)
PROT SERPL-MCNC: 7.8 G/DL — SIGNIFICANT CHANGE UP (ref 6–8.3)
RBC # BLD: 3.65 M/UL — LOW (ref 3.8–5.2)
RBC # FLD: 12.9 % — SIGNIFICANT CHANGE UP (ref 10.3–14.5)
SODIUM SERPL-SCNC: 140 MMOL/L — SIGNIFICANT CHANGE UP (ref 135–145)
WBC # BLD: 4.13 K/UL — SIGNIFICANT CHANGE UP (ref 3.8–10.5)
WBC # FLD AUTO: 4.13 K/UL — SIGNIFICANT CHANGE UP (ref 3.8–10.5)

## 2017-11-22 PROCEDURE — 85027 COMPLETE CBC AUTOMATED: CPT

## 2017-11-22 PROCEDURE — G0463: CPT

## 2017-11-22 PROCEDURE — 80053 COMPREHEN METABOLIC PANEL: CPT

## 2017-11-22 RX ORDER — SODIUM CHLORIDE 9 MG/ML
3 INJECTION INTRAMUSCULAR; INTRAVENOUS; SUBCUTANEOUS EVERY 8 HOURS
Qty: 0 | Refills: 0 | Status: DISCONTINUED | OUTPATIENT
Start: 2017-11-29 | End: 2017-12-14

## 2017-11-22 RX ORDER — ANASTROZOLE 1 MG/1
1 TABLET ORAL
Qty: 0 | Refills: 0 | COMMUNITY

## 2017-11-22 RX ORDER — LIDOCAINE HCL 20 MG/ML
0.2 VIAL (ML) INJECTION ONCE
Qty: 0 | Refills: 0 | Status: DISCONTINUED | OUTPATIENT
Start: 2017-11-29 | End: 2017-12-14

## 2017-11-22 RX ORDER — APREPITANT 80 MG/1
40 CAPSULE ORAL ONCE
Qty: 0 | Refills: 0 | Status: COMPLETED | OUTPATIENT
Start: 2017-11-29 | End: 2017-11-29

## 2017-11-22 NOTE — H&P PST ADULT - PMH
Anemia, unspecified type    Arthritis    BRCA gene mutation negative    Enlarged heart    GERD without esophagitis    Hepatomegaly  dx 1989  monitoring now 'OKAY"  Malignant neoplasm of right female breast, unspecified site of breast  s/p lumpectomy ( 5/ 2017 )  Migraine    Personal history of malignant neoplasm of breast    Tinnitus aurium, right

## 2017-11-22 NOTE — H&P PST ADULT - PSH
S/P biopsy  right breast 2017  S/P endoscopy  many years ago  S/P lumpectomy, right breast  5/2017 H/O bilateral mastectomy  8/25/17  S/P biopsy  right breast 2017  S/P endoscopy  many years ago  S/P lumpectomy, right breast  5/2017

## 2017-11-22 NOTE — H&P PST ADULT - HISTORY OF PRESENT ILLNESS
58 Y/O Female H/O Right Breast Cancer. S/P Bilateral Mastectomy and Reconstructions 8/25/17. Presents revision bilateral breast reconstruction with implants. 56 Y/O Female H/O Right Breast Cancer. S/P Bilateral Mastectomy and Reconstructions 8/25/17. Presently on Tamoxifen. Presents revision bilateral breast reconstruction with implants.

## 2017-11-22 NOTE — H&P PST ADULT - MALLAMPATI CLASS
Class II - visualization of the soft palate, fauces, and uvula/13 inch neck Class III - visualization of the soft palate and the base of the uvula/13 inch neck

## 2017-11-29 ENCOUNTER — OUTPATIENT (OUTPATIENT)
Dept: OUTPATIENT SERVICES | Facility: HOSPITAL | Age: 57
LOS: 1 days | End: 2017-11-29
Payer: MEDICAID

## 2017-11-29 ENCOUNTER — RESULT REVIEW (OUTPATIENT)
Age: 57
End: 2017-11-29

## 2017-11-29 VITALS
SYSTOLIC BLOOD PRESSURE: 137 MMHG | HEART RATE: 72 BPM | DIASTOLIC BLOOD PRESSURE: 82 MMHG | TEMPERATURE: 98 F | OXYGEN SATURATION: 98 % | WEIGHT: 134.92 LBS | RESPIRATION RATE: 16 BRPM | HEIGHT: 64 IN

## 2017-11-29 VITALS
RESPIRATION RATE: 17 BRPM | DIASTOLIC BLOOD PRESSURE: 67 MMHG | HEART RATE: 67 BPM | OXYGEN SATURATION: 100 % | SYSTOLIC BLOOD PRESSURE: 122 MMHG

## 2017-11-29 DIAGNOSIS — Z85.3 PERSONAL HISTORY OF MALIGNANT NEOPLASM OF BREAST: ICD-10-CM

## 2017-11-29 DIAGNOSIS — Z98.890 OTHER SPECIFIED POSTPROCEDURAL STATES: Chronic | ICD-10-CM

## 2017-11-29 DIAGNOSIS — Z90.11 ACQUIRED ABSENCE OF RIGHT BREAST AND NIPPLE: Chronic | ICD-10-CM

## 2017-11-29 DIAGNOSIS — Z90.13 ACQUIRED ABSENCE OF BILATERAL BREASTS AND NIPPLES: Chronic | ICD-10-CM

## 2017-11-29 PROCEDURE — 88304 TISSUE EXAM BY PATHOLOGIST: CPT | Mod: 26

## 2017-11-29 PROCEDURE — 19371 PERI-IMPLT CAPSLC BRST COMPL: CPT | Mod: 50

## 2017-11-29 PROCEDURE — 19340 INSJ BREAST IMPLT SM D MAST: CPT | Mod: 50

## 2017-11-29 PROCEDURE — 14302 TIS TRNFR ADDL 30 SQ CM: CPT

## 2017-11-29 PROCEDURE — 15777 ACELLULAR DERM MATRIX IMPLT: CPT | Mod: 50

## 2017-11-29 PROCEDURE — 88304 TISSUE EXAM BY PATHOLOGIST: CPT

## 2017-11-29 PROCEDURE — C1789: CPT

## 2017-11-29 PROCEDURE — 99261: CPT

## 2017-11-29 PROCEDURE — 14301 TIS TRNFR ANY 30.1-60 SQ CM: CPT | Mod: XS

## 2017-11-29 RX ORDER — FAMOTIDINE 10 MG/ML
0 INJECTION INTRAVENOUS
Qty: 0 | Refills: 0 | COMMUNITY

## 2017-11-29 RX ORDER — SODIUM CHLORIDE 9 MG/ML
1000 INJECTION, SOLUTION INTRAVENOUS
Qty: 0 | Refills: 0 | Status: DISCONTINUED | OUTPATIENT
Start: 2017-11-29 | End: 2017-12-14

## 2017-11-29 RX ORDER — ONDANSETRON 8 MG/1
4 TABLET, FILM COATED ORAL ONCE
Qty: 0 | Refills: 0 | Status: DISCONTINUED | OUTPATIENT
Start: 2017-11-29 | End: 2017-12-14

## 2017-11-29 RX ORDER — HYDROMORPHONE HYDROCHLORIDE 2 MG/ML
0.5 INJECTION INTRAMUSCULAR; INTRAVENOUS; SUBCUTANEOUS
Qty: 0 | Refills: 0 | Status: DISCONTINUED | OUTPATIENT
Start: 2017-11-29 | End: 2017-11-29

## 2017-11-29 RX ADMIN — APREPITANT 40 MILLIGRAM(S): 80 CAPSULE ORAL at 06:45

## 2017-11-29 NOTE — ASU PATIENT PROFILE, ADULT - PSH
H/O bilateral mastectomy  8/25/17  S/P biopsy  right breast 2017  S/P endoscopy  many years ago  S/P lumpectomy, right breast  5/2017

## 2017-11-29 NOTE — ASU PATIENT PROFILE, ADULT - VISION (WITH CORRECTIVE LENSES IF THE PATIENT USUALLY WEARS THEM):
Partially impaired: cannot see medication labels or newsprint, but can see obstacles in path, and the surrounding layout; can count fingers at arm's length Partially impaired: cannot see medication labels or newsprint, but can see obstacles in path, and the surrounding layout; can count fingers at arm's length/Wears glasses

## 2017-11-29 NOTE — ASU DISCHARGE PLAN (ADULT/PEDIATRIC). - NOTIFY
Bleeding that does not stop/Fever greater than 101 Fever greater than 101/Pain not relieved by Medications/Bleeding that does not stop

## 2017-11-29 NOTE — ASU DISCHARGE PLAN (ADULT/PEDIATRIC). - MEDICATION SUMMARY - MEDICATIONS TO TAKE
I will START or STAY ON the medications listed below when I get home from the hospital:    oxyCODONE-acetaminophen 5 mg-325 mg oral tablet  -- 1 tab(s) by mouth every 6 hours MDD:4 tabs max daily  -- Caution federal law prohibits the transfer of this drug to any person other  than the person for whom it was prescribed.  May cause drowsiness.  Alcohol may intensify this effect.  Use care when operating dangerous machinery.  This prescription cannot be refilled.  This product contains acetaminophen.  Do not use  with any other product containing acetaminophen to prevent possible liver damage.  Using more of this medication than prescribed may cause serious breathing problems.    -- Indication: For Post-operative pain    tamoxifen 20 mg oral tablet  -- 1 tab(s) by mouth once a day  -- Indication: For Home medication    clindamycin 300 mg oral capsule  -- 1 cap(s) by mouth every 8 hours   -- Finish all this medication unless otherwise directed by prescriber.  Medication should be taken with plenty of water.    -- Indication: For Post-operative antibiotics

## 2017-11-29 NOTE — ASU DISCHARGE PLAN (ADULT/PEDIATRIC). - MEDICATION SUMMARY - MEDICATIONS TO STOP TAKING
I will STOP taking the medications listed below when I get home from the hospital:    famotidine 20 mg oral tablet  -- HS and AM of surgery

## 2017-12-04 ENCOUNTER — TRANSCRIPTION ENCOUNTER (OUTPATIENT)
Age: 57
End: 2017-12-04

## 2017-12-06 LAB — SURGICAL PATHOLOGY STUDY: SIGNIFICANT CHANGE UP

## 2018-05-15 ENCOUNTER — EMERGENCY (EMERGENCY)
Facility: HOSPITAL | Age: 58
LOS: 1 days | Discharge: HOME | End: 2018-05-15
Attending: EMERGENCY MEDICINE | Admitting: EMERGENCY MEDICINE
Payer: MEDICAID

## 2018-05-15 VITALS
RESPIRATION RATE: 18 BRPM | TEMPERATURE: 98 F | OXYGEN SATURATION: 100 % | SYSTOLIC BLOOD PRESSURE: 117 MMHG | HEART RATE: 67 BPM | DIASTOLIC BLOOD PRESSURE: 51 MMHG

## 2018-05-15 DIAGNOSIS — Z90.13 ACQUIRED ABSENCE OF BILATERAL BREASTS AND NIPPLES: Chronic | ICD-10-CM

## 2018-05-15 DIAGNOSIS — Z90.11 ACQUIRED ABSENCE OF RIGHT BREAST AND NIPPLE: Chronic | ICD-10-CM

## 2018-05-15 DIAGNOSIS — Z98.890 OTHER SPECIFIED POSTPROCEDURAL STATES: Chronic | ICD-10-CM

## 2018-05-15 PROCEDURE — 99283 EMERGENCY DEPT VISIT LOW MDM: CPT | Mod: 25

## 2018-05-15 NOTE — ED ADULT TRIAGE NOTE - CHIEF COMPLAINT QUOTE
C/o left head pressure, pressure behind left eye and generalized weakness since yesterday. Denies N/V, double vision. Reports also feeling dizziness and off balance today. +blurry vision in left eye. PMH breast CA with b/l mastectomy.

## 2018-05-16 LAB
ALBUMIN SERPL ELPH-MCNC: 4.7 G/DL — SIGNIFICANT CHANGE UP (ref 3.3–5)
ALP SERPL-CCNC: 72 U/L — SIGNIFICANT CHANGE UP (ref 40–120)
ALT FLD-CCNC: 31 U/L — SIGNIFICANT CHANGE UP (ref 4–33)
AST SERPL-CCNC: 28 U/L — SIGNIFICANT CHANGE UP (ref 4–32)
BASOPHILS # BLD AUTO: 0.02 K/UL — SIGNIFICANT CHANGE UP (ref 0–0.2)
BASOPHILS NFR BLD AUTO: 0.4 % — SIGNIFICANT CHANGE UP (ref 0–2)
BILIRUB SERPL-MCNC: 0.6 MG/DL — SIGNIFICANT CHANGE UP (ref 0.2–1.2)
BUN SERPL-MCNC: 12 MG/DL — SIGNIFICANT CHANGE UP (ref 7–23)
CALCIUM SERPL-MCNC: 9.8 MG/DL — SIGNIFICANT CHANGE UP (ref 8.4–10.5)
CHLORIDE SERPL-SCNC: 98 MMOL/L — SIGNIFICANT CHANGE UP (ref 98–107)
CO2 SERPL-SCNC: 29 MMOL/L — SIGNIFICANT CHANGE UP (ref 22–31)
CREAT SERPL-MCNC: 0.58 MG/DL — SIGNIFICANT CHANGE UP (ref 0.5–1.3)
CRP SERPL-MCNC: < 5 MG/L — SIGNIFICANT CHANGE UP
EOSINOPHIL # BLD AUTO: 0.06 K/UL — SIGNIFICANT CHANGE UP (ref 0–0.5)
EOSINOPHIL NFR BLD AUTO: 1.3 % — SIGNIFICANT CHANGE UP (ref 0–6)
ERYTHROCYTE [SEDIMENTATION RATE] IN BLOOD: 17 MM/HR — SIGNIFICANT CHANGE UP (ref 4–25)
GLUCOSE SERPL-MCNC: 101 MG/DL — HIGH (ref 70–99)
HCT VFR BLD CALC: 31.6 % — LOW (ref 34.5–45)
HGB BLD-MCNC: 10.3 G/DL — LOW (ref 11.5–15.5)
IMM GRANULOCYTES # BLD AUTO: 0 # — SIGNIFICANT CHANGE UP
IMM GRANULOCYTES NFR BLD AUTO: 0 % — SIGNIFICANT CHANGE UP (ref 0–1.5)
LYMPHOCYTES # BLD AUTO: 2.06 K/UL — SIGNIFICANT CHANGE UP (ref 1–3.3)
LYMPHOCYTES # BLD AUTO: 45.9 % — HIGH (ref 13–44)
MCHC RBC-ENTMCNC: 28.3 PG — SIGNIFICANT CHANGE UP (ref 27–34)
MCHC RBC-ENTMCNC: 32.6 % — SIGNIFICANT CHANGE UP (ref 32–36)
MCV RBC AUTO: 86.8 FL — SIGNIFICANT CHANGE UP (ref 80–100)
MONOCYTES # BLD AUTO: 0.49 K/UL — SIGNIFICANT CHANGE UP (ref 0–0.9)
MONOCYTES NFR BLD AUTO: 10.9 % — SIGNIFICANT CHANGE UP (ref 2–14)
NEUTROPHILS # BLD AUTO: 1.86 K/UL — SIGNIFICANT CHANGE UP (ref 1.8–7.4)
NEUTROPHILS NFR BLD AUTO: 41.5 % — LOW (ref 43–77)
NRBC # FLD: 0 — SIGNIFICANT CHANGE UP
PLATELET # BLD AUTO: 232 K/UL — SIGNIFICANT CHANGE UP (ref 150–400)
PMV BLD: 10.3 FL — SIGNIFICANT CHANGE UP (ref 7–13)
POTASSIUM SERPL-MCNC: 4.1 MMOL/L — SIGNIFICANT CHANGE UP (ref 3.5–5.3)
POTASSIUM SERPL-SCNC: 4.1 MMOL/L — SIGNIFICANT CHANGE UP (ref 3.5–5.3)
PROT SERPL-MCNC: 7.7 G/DL — SIGNIFICANT CHANGE UP (ref 6–8.3)
RBC # BLD: 3.64 M/UL — LOW (ref 3.8–5.2)
RBC # FLD: 11.9 % — SIGNIFICANT CHANGE UP (ref 10.3–14.5)
SODIUM SERPL-SCNC: 139 MMOL/L — SIGNIFICANT CHANGE UP (ref 135–145)
WBC # BLD: 4.49 K/UL — SIGNIFICANT CHANGE UP (ref 3.8–10.5)
WBC # FLD AUTO: 4.49 K/UL — SIGNIFICANT CHANGE UP (ref 3.8–10.5)

## 2018-05-16 RX ORDER — DIPHENHYDRAMINE HCL 50 MG
25 CAPSULE ORAL ONCE
Qty: 0 | Refills: 0 | Status: COMPLETED | OUTPATIENT
Start: 2018-05-16 | End: 2018-05-16

## 2018-05-16 RX ORDER — SODIUM CHLORIDE 9 MG/ML
1000 INJECTION INTRAMUSCULAR; INTRAVENOUS; SUBCUTANEOUS ONCE
Qty: 0 | Refills: 0 | Status: COMPLETED | OUTPATIENT
Start: 2018-05-16 | End: 2018-05-16

## 2018-05-16 RX ORDER — SODIUM CHLORIDE 9 MG/ML
3 INJECTION INTRAMUSCULAR; INTRAVENOUS; SUBCUTANEOUS EVERY 8 HOURS
Qty: 0 | Refills: 0 | Status: DISCONTINUED | OUTPATIENT
Start: 2018-05-16 | End: 2018-05-19

## 2018-05-16 RX ORDER — KETOROLAC TROMETHAMINE 30 MG/ML
15 SYRINGE (ML) INJECTION ONCE
Qty: 0 | Refills: 0 | Status: DISCONTINUED | OUTPATIENT
Start: 2018-05-16 | End: 2018-05-16

## 2018-05-16 RX ORDER — METOCLOPRAMIDE HCL 10 MG
10 TABLET ORAL ONCE
Qty: 0 | Refills: 0 | Status: COMPLETED | OUTPATIENT
Start: 2018-05-16 | End: 2018-05-16

## 2018-05-16 NOTE — ED PROVIDER NOTE - PROGRESS NOTE DETAILS
pt refused all medications and wanted to leave at this time.  Understands not all labs are back and workup is incomplete.

## 2018-05-16 NOTE — ED PROVIDER NOTE - PHYSICAL EXAMINATION
Gen: Well appearing in NAD  Head: NC/AT  Eyes: PERRL, TTP over the L temporal artery region.   Neck: trachea midline  Resp:  No distress  Ext: no deformities  Neuro:  A&O appears non focal  Skin:  Warm and dry as visualized  Psych:  Normal affect and mood

## 2018-05-16 NOTE — ED ADULT NURSE NOTE - OBJECTIVE STATEMENT
Pt arrives to ED c/o left eye blurriness and pressure for 2 days.  Pt reports normal vision and no pain in right eye.  Pt feels pressure to left side of head.  Pt more comfortable with lights off.  Pt awaiting MD assessment.  Pt had eye exam last week.

## 2018-05-16 NOTE — ED PROVIDER NOTE - MEDICAL DECISION MAKING DETAILS
56y/o F with PMHx of breast CA (s/p bilateral mastectomy) presents with HA. Pt reports she had a HA 2w ago and had a workup but did not say which hospital she went to. Very resistant to repeat CT scan, cannot evaluate for metastasis, however, unlikely. Pt is tender over the temporal artery. Will send ESR, CRP to look for temporal arteritis. Symptomatic treatment for atypical migraine.

## 2018-05-16 NOTE — ED PROVIDER NOTE - PMH
Anemia, unspecified type    Arthritis    BRCA gene mutation negative    Enlarged heart    GERD without esophagitis    Hepatomegaly  dx 1989  monitoring now 'OKAY"  Malignant neoplasm of right female breast, unspecified site of breast  s/p lumpectomy ( 5/ 2017 )  Migraine    Mild intermittent asthma without complication  last episode years ago  Personal history of malignant neoplasm of breast    Tinnitus aurium, right

## 2018-05-16 NOTE — ED ADULT NURSE REASSESSMENT NOTE - NS ED NURSE REASSESS COMMENT FT1
break coverage RN - pt refused all medications and wanted to leave. pt was explained to in [place]ian and English that lab results are not back and her work up is not complete. pt IV removed. dc paperwork signed by pt. copies provided to pt.

## 2018-05-16 NOTE — ED PROVIDER NOTE - OBJECTIVE STATEMENT
58y/o F with PMHx of Breast CA (s/p double mastectomy), Anemia, presents to the ED c/o HA x1d with associated body aches and blurred vision. She describes her HA as "pressure," gradual in onset, worsening significantly today. She notes her pain is worst lateral to her L eye. Pt has not had similar HA in the past. Denies LOC, nausea/vomiting, any other complaints. Allergic to Penicillin and Aspirin.

## 2018-05-24 ENCOUNTER — APPOINTMENT (OUTPATIENT)
Dept: ORTHOPEDIC SURGERY | Facility: CLINIC | Age: 58
End: 2018-05-24
Payer: COMMERCIAL

## 2018-05-24 VITALS
HEIGHT: 65 IN | DIASTOLIC BLOOD PRESSURE: 76 MMHG | SYSTOLIC BLOOD PRESSURE: 121 MMHG | HEART RATE: 66 BPM | BODY MASS INDEX: 24.16 KG/M2 | WEIGHT: 145 LBS

## 2018-05-24 DIAGNOSIS — Z78.9 OTHER SPECIFIED HEALTH STATUS: ICD-10-CM

## 2018-05-24 DIAGNOSIS — Z80.9 FAMILY HISTORY OF MALIGNANT NEOPLASM, UNSPECIFIED: ICD-10-CM

## 2018-05-24 DIAGNOSIS — Z87.39 PERSONAL HISTORY OF OTHER DISEASES OF THE MUSCULOSKELETAL SYSTEM AND CONNECTIVE TISSUE: ICD-10-CM

## 2018-05-24 DIAGNOSIS — Z82.61 FAMILY HISTORY OF ARTHRITIS: ICD-10-CM

## 2018-05-24 DIAGNOSIS — Z60.2 PROBLEMS RELATED TO LIVING ALONE: ICD-10-CM

## 2018-05-24 DIAGNOSIS — M51.36 OTHER INTERVERTEBRAL DISC DEGENERATION, LUMBAR REGION: ICD-10-CM

## 2018-05-24 DIAGNOSIS — Z85.3 PERSONAL HISTORY OF MALIGNANT NEOPLASM OF BREAST: ICD-10-CM

## 2018-05-24 PROCEDURE — 99204 OFFICE O/P NEW MOD 45 MIN: CPT

## 2018-05-24 PROCEDURE — 72100 X-RAY EXAM L-S SPINE 2/3 VWS: CPT

## 2018-05-24 PROCEDURE — 72040 X-RAY EXAM NECK SPINE 2-3 VW: CPT

## 2018-05-24 SDOH — SOCIAL STABILITY - SOCIAL INSECURITY: PROBLEMS RELATED TO LIVING ALONE: Z60.2

## 2018-06-04 ENCOUNTER — APPOINTMENT (OUTPATIENT)
Dept: ORTHOPEDIC SURGERY | Facility: CLINIC | Age: 58
End: 2018-06-04
Payer: COMMERCIAL

## 2018-06-04 DIAGNOSIS — M50.30 OTHER CERVICAL DISC DEGENERATION, UNSPECIFIED CERVICAL REGION: ICD-10-CM

## 2018-06-04 PROCEDURE — 99214 OFFICE O/P EST MOD 30 MIN: CPT

## 2018-06-05 ENCOUNTER — APPOINTMENT (OUTPATIENT)
Dept: ORTHOPEDIC SURGERY | Facility: CLINIC | Age: 58
End: 2018-06-05
Payer: COMMERCIAL

## 2018-06-05 DIAGNOSIS — M21.42 FLAT FOOT [PES PLANUS] (ACQUIRED), RIGHT FOOT: ICD-10-CM

## 2018-06-05 DIAGNOSIS — M79.671 PAIN IN RIGHT FOOT: ICD-10-CM

## 2018-06-05 DIAGNOSIS — M21.6X9 OTHER ACQUIRED DEFORMITIES OF UNSPECIFIED FOOT: ICD-10-CM

## 2018-06-05 DIAGNOSIS — M79.672 PAIN IN RIGHT FOOT: ICD-10-CM

## 2018-06-05 DIAGNOSIS — M76.61 ACHILLES TENDINITIS, RIGHT LEG: ICD-10-CM

## 2018-06-05 DIAGNOSIS — M20.10 HALLUX VALGUS (ACQUIRED), UNSPECIFIED FOOT: ICD-10-CM

## 2018-06-05 DIAGNOSIS — M21.41 FLAT FOOT [PES PLANUS] (ACQUIRED), RIGHT FOOT: ICD-10-CM

## 2018-06-05 DIAGNOSIS — M76.62 ACHILLES TENDINITIS, RIGHT LEG: ICD-10-CM

## 2018-06-05 DIAGNOSIS — M62.89 OTHER SPECIFIED DISORDERS OF MUSCLE: ICD-10-CM

## 2018-06-05 DIAGNOSIS — M72.2 PLANTAR FASCIAL FIBROMATOSIS: ICD-10-CM

## 2018-06-05 PROBLEM — M50.30 DEGENERATIVE CERVICAL DISC: Status: ACTIVE | Noted: 2018-05-24

## 2018-06-05 PROCEDURE — 73630 X-RAY EXAM OF FOOT: CPT | Mod: RT

## 2018-06-05 PROCEDURE — 99214 OFFICE O/P EST MOD 30 MIN: CPT

## 2018-07-16 PROBLEM — R16.0 HEPATOMEGALY, NOT ELSEWHERE CLASSIFIED: Chronic | Status: ACTIVE | Noted: 2017-05-18

## 2018-07-16 PROBLEM — M54.9 DORSALGIA, UNSPECIFIED: Chronic | Status: INACTIVE | Noted: 2017-05-18 | Resolved: 2017-08-15

## 2018-07-16 PROBLEM — C50.911 MALIGNANT NEOPLASM OF UNSPECIFIED SITE OF RIGHT FEMALE BREAST: Chronic | Status: ACTIVE | Noted: 2017-05-18

## 2018-09-11 ENCOUNTER — APPOINTMENT (OUTPATIENT)
Dept: NEUROLOGY | Facility: CLINIC | Age: 58
End: 2018-09-11

## 2018-09-27 PROBLEM — K21.9 GASTRO-ESOPHAGEAL REFLUX DISEASE WITHOUT ESOPHAGITIS: Chronic | Status: ACTIVE | Noted: 2017-05-18

## 2018-09-27 PROBLEM — I51.7 CARDIOMEGALY: Chronic | Status: ACTIVE | Noted: 2017-05-18

## 2018-09-27 PROBLEM — Z85.3 PERSONAL HISTORY OF MALIGNANT NEOPLASM OF BREAST: Chronic | Status: ACTIVE | Noted: 2017-08-15

## 2018-09-27 PROBLEM — Z13.71 ENCOUNTER FOR NONPROCREATIVE SCREENING FOR GENETIC DISEASE CARRIER STATUS: Chronic | Status: ACTIVE | Noted: 2017-05-18

## 2018-09-27 PROBLEM — J45.20 MILD INTERMITTENT ASTHMA, UNCOMPLICATED: Chronic | Status: ACTIVE | Noted: 2017-11-22

## 2018-09-27 PROBLEM — M19.90 UNSPECIFIED OSTEOARTHRITIS, UNSPECIFIED SITE: Chronic | Status: ACTIVE | Noted: 2017-05-18

## 2018-09-27 PROBLEM — G43.909 MIGRAINE, UNSPECIFIED, NOT INTRACTABLE, WITHOUT STATUS MIGRAINOSUS: Chronic | Status: ACTIVE | Noted: 2017-08-15

## 2018-09-27 PROBLEM — D64.9 ANEMIA, UNSPECIFIED: Chronic | Status: ACTIVE | Noted: 2017-05-18

## 2018-09-27 PROBLEM — H93.11 TINNITUS, RIGHT EAR: Chronic | Status: ACTIVE | Noted: 2017-05-18

## 2018-10-11 ENCOUNTER — OUTPATIENT (OUTPATIENT)
Dept: OUTPATIENT SERVICES | Facility: HOSPITAL | Age: 58
LOS: 1 days | End: 2018-10-11
Payer: MEDICAID

## 2018-10-11 VITALS
OXYGEN SATURATION: 100 % | HEIGHT: 65 IN | DIASTOLIC BLOOD PRESSURE: 83 MMHG | HEART RATE: 62 BPM | WEIGHT: 143.08 LBS | TEMPERATURE: 99 F | RESPIRATION RATE: 14 BRPM | SYSTOLIC BLOOD PRESSURE: 127 MMHG

## 2018-10-11 DIAGNOSIS — Z90.13 ACQUIRED ABSENCE OF BILATERAL BREASTS AND NIPPLES: ICD-10-CM

## 2018-10-11 DIAGNOSIS — Z98.890 OTHER SPECIFIED POSTPROCEDURAL STATES: Chronic | ICD-10-CM

## 2018-10-11 DIAGNOSIS — M95.4 ACQUIRED DEFORMITY OF CHEST AND RIB: ICD-10-CM

## 2018-10-11 DIAGNOSIS — Z90.11 ACQUIRED ABSENCE OF RIGHT BREAST AND NIPPLE: Chronic | ICD-10-CM

## 2018-10-11 DIAGNOSIS — Z01.818 ENCOUNTER FOR OTHER PREPROCEDURAL EXAMINATION: ICD-10-CM

## 2018-10-11 DIAGNOSIS — Z90.13 ACQUIRED ABSENCE OF BILATERAL BREASTS AND NIPPLES: Chronic | ICD-10-CM

## 2018-10-11 DIAGNOSIS — Z42.1 ENCOUNTER FOR BREAST RECONSTRUCTION FOLLOWING MASTECTOMY: ICD-10-CM

## 2018-10-11 LAB
HCT VFR BLD CALC: 30.7 % — LOW (ref 34.5–45)
HGB BLD-MCNC: 9.4 G/DL — LOW (ref 11.5–15.5)
MCHC RBC-ENTMCNC: 26.9 PG — LOW (ref 27–34)
MCHC RBC-ENTMCNC: 30.6 GM/DL — LOW (ref 32–36)
MCV RBC AUTO: 88 FL — SIGNIFICANT CHANGE UP (ref 80–100)
PLATELET # BLD AUTO: 252 K/UL — SIGNIFICANT CHANGE UP (ref 150–400)
RBC # BLD: 3.49 M/UL — LOW (ref 3.8–5.2)
RBC # FLD: 13 % — SIGNIFICANT CHANGE UP (ref 10.3–14.5)
WBC # BLD: 3.43 K/UL — LOW (ref 3.8–10.5)
WBC # FLD AUTO: 3.43 K/UL — LOW (ref 3.8–10.5)

## 2018-10-11 PROCEDURE — G0463: CPT

## 2018-10-11 PROCEDURE — 85027 COMPLETE CBC AUTOMATED: CPT

## 2018-10-11 RX ORDER — TAMOXIFEN CITRATE 20 MG/1
1 TABLET, FILM COATED ORAL
Qty: 0 | Refills: 0 | COMMUNITY

## 2018-10-11 RX ORDER — SODIUM CHLORIDE 9 MG/ML
3 INJECTION INTRAMUSCULAR; INTRAVENOUS; SUBCUTANEOUS EVERY 8 HOURS
Qty: 0 | Refills: 0 | Status: DISCONTINUED | OUTPATIENT
Start: 2018-10-18 | End: 2018-11-02

## 2018-10-11 RX ORDER — LIDOCAINE HCL 20 MG/ML
0.2 VIAL (ML) INJECTION ONCE
Qty: 0 | Refills: 0 | Status: DISCONTINUED | OUTPATIENT
Start: 2018-10-18 | End: 2018-11-02

## 2018-10-11 NOTE — H&P PST ADULT - PSH
H/O bilateral mastectomy  8/25/17  S/P biopsy  right breast 2017  S/P endoscopy  many years ago  S/P lumpectomy, right breast  5/2017 H/O bilateral mastectomy  8/25/17  S/P biopsy  right breast 2017  S/P breast reconstruction, bilateral  11/2017  S/P endoscopy  many years ago  S/P lumpectomy, right breast  5/2017

## 2018-10-11 NOTE — H&P PST ADULT - HISTORY OF PRESENT ILLNESS
56 Y/O Female H/O Right Breast Cancer. S/P Bilateral Mastectomy and Reconstructions 8/25/17. Presently on Tamoxifen. Presents revision bilateral breast reconstruction with implants. 58 Y/O Female H/O Right Breast Cancer. S/P Bilateral Mastectomy and Reconstructions with silicone implant 2017.  presents to PST scheduled for bilateral nipple areola reconstruction.

## 2018-10-17 ENCOUNTER — TRANSCRIPTION ENCOUNTER (OUTPATIENT)
Age: 58
End: 2018-10-17

## 2018-10-18 ENCOUNTER — OUTPATIENT (OUTPATIENT)
Dept: OUTPATIENT SERVICES | Facility: HOSPITAL | Age: 58
LOS: 1 days | End: 2018-10-18
Payer: MEDICAID

## 2018-10-18 VITALS
TEMPERATURE: 97 F | RESPIRATION RATE: 16 BRPM | HEART RATE: 71 BPM | OXYGEN SATURATION: 98 % | DIASTOLIC BLOOD PRESSURE: 55 MMHG | SYSTOLIC BLOOD PRESSURE: 112 MMHG

## 2018-10-18 VITALS
WEIGHT: 143.08 LBS | RESPIRATION RATE: 18 BRPM | DIASTOLIC BLOOD PRESSURE: 83 MMHG | TEMPERATURE: 99 F | HEART RATE: 83 BPM | OXYGEN SATURATION: 100 % | SYSTOLIC BLOOD PRESSURE: 144 MMHG | HEIGHT: 65 IN

## 2018-10-18 DIAGNOSIS — Z98.890 OTHER SPECIFIED POSTPROCEDURAL STATES: Chronic | ICD-10-CM

## 2018-10-18 DIAGNOSIS — Z42.1 ENCOUNTER FOR BREAST RECONSTRUCTION FOLLOWING MASTECTOMY: ICD-10-CM

## 2018-10-18 DIAGNOSIS — Z01.818 ENCOUNTER FOR OTHER PREPROCEDURAL EXAMINATION: ICD-10-CM

## 2018-10-18 DIAGNOSIS — M95.4 ACQUIRED DEFORMITY OF CHEST AND RIB: ICD-10-CM

## 2018-10-18 DIAGNOSIS — Z90.13 ACQUIRED ABSENCE OF BILATERAL BREASTS AND NIPPLES: Chronic | ICD-10-CM

## 2018-10-18 DIAGNOSIS — Z90.13 ACQUIRED ABSENCE OF BILATERAL BREASTS AND NIPPLES: ICD-10-CM

## 2018-10-18 DIAGNOSIS — Z90.11 ACQUIRED ABSENCE OF RIGHT BREAST AND NIPPLE: Chronic | ICD-10-CM

## 2018-10-18 PROCEDURE — 19350 NIPPLE/AREOLA RECONSTRUCTION: CPT | Mod: 50

## 2018-10-18 PROCEDURE — 15738 MUSCLE-SKIN GRAFT LEG: CPT | Mod: LT

## 2018-10-18 RX ORDER — OXYCODONE HYDROCHLORIDE 5 MG/1
5 TABLET ORAL ONCE
Qty: 0 | Refills: 0 | Status: DISCONTINUED | OUTPATIENT
Start: 2018-10-18 | End: 2018-10-18

## 2018-10-18 RX ORDER — APREPITANT 80 MG/1
40 CAPSULE ORAL ONCE
Qty: 0 | Refills: 0 | Status: COMPLETED | OUTPATIENT
Start: 2018-10-18 | End: 2018-10-18

## 2018-10-18 RX ORDER — ONDANSETRON 8 MG/1
4 TABLET, FILM COATED ORAL ONCE
Qty: 0 | Refills: 0 | Status: DISCONTINUED | OUTPATIENT
Start: 2018-10-18 | End: 2018-11-02

## 2018-10-18 RX ORDER — SODIUM CHLORIDE 9 MG/ML
1000 INJECTION, SOLUTION INTRAVENOUS
Qty: 0 | Refills: 0 | Status: DISCONTINUED | OUTPATIENT
Start: 2018-10-18 | End: 2018-11-02

## 2018-10-18 RX ORDER — HYDROMORPHONE HYDROCHLORIDE 2 MG/ML
0.25 INJECTION INTRAMUSCULAR; INTRAVENOUS; SUBCUTANEOUS
Qty: 0 | Refills: 0 | Status: DISCONTINUED | OUTPATIENT
Start: 2018-10-18 | End: 2018-10-18

## 2018-10-18 RX ORDER — CHOLECALCIFEROL (VITAMIN D3) 125 MCG
1 CAPSULE ORAL
Qty: 0 | Refills: 0 | COMMUNITY

## 2018-10-18 RX ADMIN — APREPITANT 40 MILLIGRAM(S): 80 CAPSULE ORAL at 06:17

## 2018-10-18 RX ADMIN — SODIUM CHLORIDE 3 MILLILITER(S): 9 INJECTION INTRAMUSCULAR; INTRAVENOUS; SUBCUTANEOUS at 06:17

## 2018-10-18 NOTE — ASU PATIENT PROFILE, ADULT - PSH
H/O bilateral mastectomy  8/25/17  S/P biopsy  right breast 2017  S/P breast reconstruction, bilateral  11/2017  S/P endoscopy  many years ago  S/P lumpectomy, right breast  5/2017

## 2018-10-18 NOTE — ASU PREOP CHECKLIST - TO WHOM
OR GALILEO GOODMAN received from Jace Melendrez to OR GALILEO GOODMAN received from Jace Lott to OR GALILEO GOODMAN

## 2019-09-13 ENCOUNTER — APPOINTMENT (OUTPATIENT)
Dept: ENDOCRINOLOGY | Facility: CLINIC | Age: 59
End: 2019-09-13
Payer: MEDICAID

## 2019-09-13 VITALS
HEART RATE: 76 BPM | SYSTOLIC BLOOD PRESSURE: 112 MMHG | DIASTOLIC BLOOD PRESSURE: 68 MMHG | BODY MASS INDEX: 23.46 KG/M2 | OXYGEN SATURATION: 98 % | WEIGHT: 141 LBS

## 2019-09-13 DIAGNOSIS — E04.1 NONTOXIC SINGLE THYROID NODULE: ICD-10-CM

## 2019-09-13 PROCEDURE — 99203 OFFICE O/P NEW LOW 30 MIN: CPT

## 2019-09-19 ENCOUNTER — APPOINTMENT (OUTPATIENT)
Dept: ULTRASOUND IMAGING | Facility: IMAGING CENTER | Age: 59
End: 2019-09-19
Payer: MEDICAID

## 2019-09-19 ENCOUNTER — OUTPATIENT (OUTPATIENT)
Dept: OUTPATIENT SERVICES | Facility: HOSPITAL | Age: 59
LOS: 1 days | End: 2019-09-19
Payer: MEDICAID

## 2019-09-19 ENCOUNTER — RESULT REVIEW (OUTPATIENT)
Age: 59
End: 2019-09-19

## 2019-09-19 DIAGNOSIS — Z98.890 OTHER SPECIFIED POSTPROCEDURAL STATES: Chronic | ICD-10-CM

## 2019-09-19 DIAGNOSIS — Z90.13 ACQUIRED ABSENCE OF BILATERAL BREASTS AND NIPPLES: Chronic | ICD-10-CM

## 2019-09-19 DIAGNOSIS — E04.1 NONTOXIC SINGLE THYROID NODULE: ICD-10-CM

## 2019-09-19 DIAGNOSIS — Z90.11 ACQUIRED ABSENCE OF RIGHT BREAST AND NIPPLE: Chronic | ICD-10-CM

## 2019-09-19 PROCEDURE — 10005 FNA BX W/US GDN 1ST LES: CPT

## 2019-09-19 PROCEDURE — 88173 CYTOPATH EVAL FNA REPORT: CPT | Mod: 26

## 2019-09-19 PROCEDURE — 88173 CYTOPATH EVAL FNA REPORT: CPT

## 2019-09-19 PROCEDURE — 88172 CYTP DX EVAL FNA 1ST EA SITE: CPT

## 2019-10-02 PROBLEM — Z60.2 PERSON LIVING ALONE: Status: ACTIVE | Noted: 2018-05-24

## 2020-03-16 ENCOUNTER — APPOINTMENT (OUTPATIENT)
Dept: ENDOCRINOLOGY | Facility: CLINIC | Age: 60
End: 2020-03-16

## 2020-03-16 VITALS
OXYGEN SATURATION: 98 % | TEMPERATURE: 98.9 F | SYSTOLIC BLOOD PRESSURE: 142 MMHG | BODY MASS INDEX: 22.82 KG/M2 | DIASTOLIC BLOOD PRESSURE: 84 MMHG | HEART RATE: 70 BPM | WEIGHT: 137 LBS | HEIGHT: 65 IN

## 2020-12-10 ENCOUNTER — APPOINTMENT (OUTPATIENT)
Dept: ULTRASOUND IMAGING | Facility: IMAGING CENTER | Age: 60
End: 2020-12-10
Payer: MEDICAID

## 2020-12-10 ENCOUNTER — RESULT REVIEW (OUTPATIENT)
Age: 60
End: 2020-12-10

## 2020-12-10 ENCOUNTER — OUTPATIENT (OUTPATIENT)
Dept: OUTPATIENT SERVICES | Facility: HOSPITAL | Age: 60
LOS: 1 days | End: 2020-12-10
Payer: MEDICAID

## 2020-12-10 DIAGNOSIS — Z90.11 ACQUIRED ABSENCE OF RIGHT BREAST AND NIPPLE: Chronic | ICD-10-CM

## 2020-12-10 DIAGNOSIS — Z90.13 ACQUIRED ABSENCE OF BILATERAL BREASTS AND NIPPLES: Chronic | ICD-10-CM

## 2020-12-10 DIAGNOSIS — Z98.890 OTHER SPECIFIED POSTPROCEDURAL STATES: Chronic | ICD-10-CM

## 2020-12-10 DIAGNOSIS — E04.1 NONTOXIC SINGLE THYROID NODULE: ICD-10-CM

## 2020-12-10 DIAGNOSIS — E04.2 NONTOXIC MULTINODULAR GOITER: ICD-10-CM

## 2020-12-10 PROCEDURE — 10005 FNA BX W/US GDN 1ST LES: CPT

## 2020-12-10 PROCEDURE — 88172 CYTP DX EVAL FNA 1ST EA SITE: CPT

## 2020-12-10 PROCEDURE — 88173 CYTOPATH EVAL FNA REPORT: CPT

## 2020-12-10 PROCEDURE — 88173 CYTOPATH EVAL FNA REPORT: CPT | Mod: 26

## 2021-05-21 NOTE — ED PROVIDER NOTE - DISCHARGE DATE
If you are a smoker, it is important for your health to stop smoking. Please be aware that second hand smoke is also harmful. 16-May-2018

## 2021-07-22 NOTE — H&P PST ADULT - BACK
detailed exam
PROBLEM DIAGNOSES  Problem: S/P breast reconstruction  Assessment and Plan: Patient schedule for reconstruction to breast and abdomen s/p surgery. Labs pending.

## 2021-11-02 NOTE — H&P PST ADULT - PRO ANTICIPATED DISCH DISP
[de-identified] : 52 y/o presents for follow up of her hypertension.  At her last visit on 9/2/2021 her lisinopril was increased to lisinopril HCT 40/25 daily.  She has been taking regularly without any problems.  She has had a blood pressure checked on numerous occasions because she had some surgery done on her scalp because of cysts.  Blood pressure has been normal when checked. home

## 2023-02-16 ENCOUNTER — APPOINTMENT (OUTPATIENT)
Dept: OTOLARYNGOLOGY | Facility: CLINIC | Age: 63
End: 2023-02-16
Payer: MEDICAID

## 2023-02-16 VITALS
HEIGHT: 65 IN | DIASTOLIC BLOOD PRESSURE: 74 MMHG | BODY MASS INDEX: 22.82 KG/M2 | SYSTOLIC BLOOD PRESSURE: 146 MMHG | HEART RATE: 80 BPM | WEIGHT: 137 LBS

## 2023-02-16 DIAGNOSIS — A04.8 OTHER SPECIFIED BACTERIAL INTESTINAL INFECTIONS: ICD-10-CM

## 2023-02-16 DIAGNOSIS — K21.9 GASTRO-ESOPHAGEAL REFLUX DISEASE W/OUT ESOPHAGITIS: ICD-10-CM

## 2023-02-16 PROCEDURE — 99204 OFFICE O/P NEW MOD 45 MIN: CPT

## 2023-02-16 RX ORDER — CLARITHROMYCIN 500 MG/1
500 TABLET, FILM COATED ORAL
Qty: 28 | Refills: 0 | Status: COMPLETED | COMMUNITY
Start: 2018-01-23 | End: 2023-02-16

## 2023-02-16 RX ORDER — TETRACYCLINE HYDROCHLORIDE 500 MG/1
500 CAPSULE ORAL
Qty: 40 | Refills: 0 | Status: COMPLETED | COMMUNITY
Start: 2018-03-27 | End: 2023-02-16

## 2023-02-16 RX ORDER — AMOXICILLIN 250 MG/5ML
250 POWDER, FOR SUSPENSION ORAL
Qty: 200 | Refills: 0 | Status: COMPLETED | COMMUNITY
Start: 2018-04-23 | End: 2023-02-16

## 2023-02-16 RX ORDER — TRIAMCINOLONE ACETONIDE 1 MG/G
0.1 PASTE DENTAL
Qty: 5 | Refills: 0 | Status: COMPLETED | COMMUNITY
Start: 2018-03-28 | End: 2023-02-16

## 2023-02-16 RX ORDER — LETROZOLE TABLETS 2.5 MG/1
2.5 TABLET, FILM COATED ORAL
Qty: 30 | Refills: 0 | Status: COMPLETED | COMMUNITY
Start: 2018-01-24 | End: 2023-02-16

## 2023-02-16 RX ORDER — LEVOFLOXACIN 500 MG/1
500 TABLET, FILM COATED ORAL
Qty: 10 | Refills: 0 | Status: COMPLETED | COMMUNITY
Start: 2018-03-27 | End: 2023-02-16

## 2023-02-16 RX ORDER — OMEPRAZOLE 20 MG/1
20 CAPSULE, DELAYED RELEASE ORAL
Qty: 28 | Refills: 0 | Status: COMPLETED | COMMUNITY
Start: 2018-01-23 | End: 2023-02-16

## 2023-02-16 RX ORDER — SODIUM SULFATE, POTASSIUM SULFATE, MAGNESIUM SULFATE 17.5; 3.13; 1.6 G/ML; G/ML; G/ML
17.5-3.13-1.6 SOLUTION, CONCENTRATE ORAL
Qty: 354 | Refills: 0 | Status: COMPLETED | COMMUNITY
Start: 2018-01-22 | End: 2023-02-16

## 2023-02-16 RX ORDER — IBUPROFEN 400 MG/1
400 TABLET, FILM COATED ORAL
Qty: 21 | Refills: 0 | Status: COMPLETED | COMMUNITY
Start: 2018-01-16 | End: 2023-02-16

## 2023-02-16 RX ORDER — METRONIDAZOLE 500 MG/1
500 TABLET ORAL
Qty: 28 | Refills: 0 | Status: COMPLETED | COMMUNITY
Start: 2018-01-23 | End: 2023-02-16

## 2023-02-16 RX ORDER — IBUPROFEN 800 MG/1
800 TABLET, FILM COATED ORAL
Qty: 21 | Refills: 0 | Status: COMPLETED | COMMUNITY
Start: 2018-04-17 | End: 2023-02-16

## 2023-02-16 RX ORDER — CLINDAMYCIN HYDROCHLORIDE 300 MG/1
300 CAPSULE ORAL
Qty: 21 | Refills: 0 | Status: COMPLETED | COMMUNITY
Start: 2017-11-29 | End: 2023-02-16

## 2023-02-16 RX ORDER — SUMATRIPTAN 25 MG/1
25 TABLET, FILM COATED ORAL
Qty: 7 | Refills: 0 | Status: COMPLETED | COMMUNITY
Start: 2018-04-17 | End: 2023-02-16

## 2023-02-16 RX ORDER — METHOCARBAMOL 750 MG/1
750 TABLET, FILM COATED ORAL
Qty: 21 | Refills: 0 | Status: COMPLETED | COMMUNITY
Start: 2018-01-16 | End: 2023-02-16

## 2023-02-16 RX ORDER — DOXYCYCLINE HYCLATE 100 MG/1
100 CAPSULE ORAL
Qty: 28 | Refills: 0 | Status: COMPLETED | COMMUNITY
Start: 2018-03-28 | End: 2023-02-16

## 2023-02-16 RX ORDER — CIPROFLOXACIN HYDROCHLORIDE 500 MG/1
500 TABLET, FILM COATED ORAL
Qty: 20 | Refills: 0 | Status: COMPLETED | COMMUNITY
Start: 2017-12-04 | End: 2023-02-16

## 2023-02-16 RX ORDER — BACITRACIN 500 [USP'U]/G
500 OINTMENT OPHTHALMIC
Qty: 4 | Refills: 0 | Status: COMPLETED | COMMUNITY
Start: 2018-03-14 | End: 2023-02-16

## 2023-02-16 NOTE — CONSULT LETTER
[Dear  ___] : Dear  [unfilled], [Consult Letter:] : I had the pleasure of evaluating your patient, [unfilled]. [Please see my note below.] : Please see my note below. [Consult Closing:] : Thank you very much for allowing me to participate in the care of this patient.  If you have any questions, please do not hesitate to contact me. [Sincerely,] : Sincerely, [FreeTextEntry3] : Kenan Fiore M.D. \par Division of Laryngology | Department of Otolaryngology \par [facility] \[place] [id number]

## 2023-02-16 NOTE — ASSESSMENT
[FreeTextEntry1] : Assessment/Plan: \par Patient states that she will not take any medications at this time.  She blames this on her liver getting in pain whenever she takes any meds.  Patient also refused my scope today.   As she requested only a liquid and not any pills or prescription medication, at this time I recommended Reflux Gourmet.  We also went over anti-reflux diet. She understands that she likely continues to have an H pylori infection.  As such she warrants treatment.  For this I am referring her to GI.  If she refuses all medications then she understands the infection may cause serious issues.  Defer to GI regarding Bravo.  Patient refusing any "tubes" in her nose in regards to any future treatment.  Defer to GI regarding hydrogen breath test.  I will request outside swallow study images to see if MBS is warranted.  As patient is refusing scope or other management apart from Reflux Gourmet at this time, no further ENT follow up warranted at this time.\par \par Total time: 45 minutes; total time does not include time spent performing the procedure and its related elements. It does include all other face to face and non face to face pre and post visit tasks performed on the same date of service.

## 2023-02-16 NOTE — HISTORY OF PRESENT ILLNESS
[de-identified] : FLAKO SHEEHAN is a 62 year old woman who presents to the [facility] with dysphagia to liquids and solids intermittently. States this started after an EGD performed by Dr. Prince Mendez on 3/8/22. States she got the EGD due to difficulty breathing at night and lower sternal pain that had started in Oct 2021. The breathing difficulty some times occurs during the day and is not associated with inspiratory stridor.  Denies ever having taking any reflux medications. Was prescribed PPI by Dr. Burrows but refused to take these. Pathology report from prior EGD this showed H pylori.  She was prescribed "4 antibiotics" but took them for only 2-3 days as she her stomach became very painful.  Patient is referred by Dr. Burrows. Patient with history of breast cancer - s/p double mastectomy in 2019. Reports globus sensation choking with liquids and solid foods. States this began about 4 months ago. History of acid reflux - was prescribed Omeprazole in the past states never took it.\par \par Patient is refusing scope today.  States she had a bad feeling for 2 weeks after her last doctor scoped her and doesn't want that again.  Understands that I may be missing a diagnosis if I can't scope and states she is ok with that.\par \par Had outside esophagram with barium/air performed on 12/28/22 at [facility] with report of normal but no images.

## 2023-02-20 NOTE — ASU PREOP CHECKLIST - TEMPERATURE IN FAHRENHEIT (DEGREES F)
Ja Johnson is a 25 y.o. female presents for a problem visit. Chief Complaint   Patient presents with    Vaginal Bleeding     Patient's last menstrual period was 02/13/2023 (exact date). Birth Control: Implant. Last Pap: The pt has never had a pap smear. The patient is reporting having:  vaginal bleeding since the insertion of Implant in Dec. 2022. She states the bleeding is heavy and associated with quarter size clots. Denies any severe cramping. She gets lightheaded at times. 1. Have you been to the ER, urgent care clinic, or hospitalized since your last visit? No    2. Have you seen or consulted any other health care providers outside of the [address] [place] [address] since your last visit?  No    Examination chaperoned by Juan Noble MA. 99.3

## 2023-06-25 NOTE — H&P PST ADULT - BSA (M2)
INTERVAL HPI/OVERNIGHT EVENTS:  Pt seen and examined  Reports melena overnight   Reports feeling very tired   Labs noted   s/p 2U PRBC        MEDICATIONS  (STANDING):  cephalexin 500 milliGRAM(s) Oral four times a day  gabapentin 100 milliGRAM(s) Oral three times a day  lidocaine   4% Patch 1 Patch Transdermal daily  naloxone Injectable 0.4 milliGRAM(s) IV Push once  pantoprazole    Tablet 40 milliGRAM(s) Oral before breakfast  polyethylene glycol 3350 17 Gram(s) Oral daily  senna 2 Tablet(s) Oral at bedtime    MEDICATIONS  (PRN):  acetaminophen     Tablet .. 650 milliGRAM(s) Oral every 6 hours PRN Temp greater or equal to 38C (100.4F), Mild Pain (1 - 3)  aluminum hydroxide/magnesium hydroxide/simethicone Suspension 30 milliLiter(s) Oral every 4 hours PRN Dyspepsia  bisacodyl 5 milliGRAM(s) Oral daily PRN Constipation  melatonin 3 milliGRAM(s) Oral at bedtime PRN Insomnia  ondansetron Injectable 4 milliGRAM(s) IV Push every 8 hours PRN Nausea and/or Vomiting  oxyCODONE    IR 10 milliGRAM(s) Oral every 6 hours PRN Severe Pain (7 - 10)  oxyCODONE    IR 5 milliGRAM(s) Oral every 6 hours PRN Moderate Pain (4 - 6)  simethicone 80 milliGRAM(s) Chew five times a day PRN Gas      Allergies  No Known Allergies    Intolerances        REVIEW OF SYSTEMS:  CONSTITUTIONAL: No fever or chills  HEENT:  No headache, no sore throat  RESPIRATORY: No cough, wheezing, or shortness of breath  CARDIOVASCULAR: No chest pain, palpitations, or leg swelling  GASTROINTESTINAL: No abd pain, nausea, vomiting, +melena       PHYSICAL EXAM:   Vital Signs:  Vital Signs Last 24 Hrs  T(C): 36.6 (25 Jun 2023 08:47), Max: 36.9 (24 Jun 2023 13:21)  T(F): 97.9 (25 Jun 2023 08:47), Max: 98.4 (24 Jun 2023 13:21)  HR: 100 (25 Jun 2023 08:47) (73 - 100)  BP: 134/83 (25 Jun 2023 08:47) (105/69 - 134/83)  BP(mean): --  RR: 18 (25 Jun 2023 08:47) (17 - 18)  SpO2: 96% (25 Jun 2023 08:47) (94% - 96%)    Parameters below as of 25 Jun 2023 08:47  Patient On (Oxygen Delivery Method): room air      CHEST/LUNG:  CTA b/l, no rales, wheezes, or rhonchi  HEART:  RRR, S1, S2, +Tachy   ABDOMEN:  BS+, soft, nontender, nondistended  EXTREMITIES: no edema, cyanosis, or calf tenderness  NERVOUS SYSTEM: AA&Ox3        LABS:                        7.4    6.77  )-----------( 293      ( 25 Jun 2023 06:24 )             23.7     06-25    140  |  107  |  13  ----------------------------<  92  3.8   |  27  |  0.54    Ca    8.1<L>      25 Jun 2023 06:24  Phos  2.5     06-24  Mg     2.2     06-24    TPro  6.1  /  Alb  2.5<L>  /  TBili  0.7  /  DBili  x   /  AST  46<H>  /  ALT  51  /  AlkPhos  50  06-25    PT/INR - ( 23 Jun 2023 22:30 )   PT: 11.6 sec;   INR: 0.99 ratio         PTT - ( 23 Jun 2023 22:30 )  PTT:28.2 sec  Urinalysis Basic - ( 25 Jun 2023 06:24 )    Color: x / Appearance: x / SG: x / pH: x  Gluc: 92 mg/dL / Ketone: x  / Bili: x / Urobili: x   Blood: x / Protein: x / Nitrite: x   Leuk Esterase: x / RBC: x / WBC x   Sq Epi: x / Non Sq Epi: x / Bacteria: x         1.65

## 2023-07-26 NOTE — H&P PST ADULT - COMFORT LEVEL, ACCEPTABLE
sun protection for her freshly healing wound. I told her she has no restriction and can live a normal life. She will follow-up with Dr. Marie Starr for her cardiac issues. In terms of imaging follow-up, I scheduled a carotid duplex scan 3 months from now and I will see her in my office after that again. Thank you for the opportunity to participate in her care. Please don't hesitate to contact me should you have any questions or concerns regarding Ms. Juliana Coats. I remain with best regards, yours        Fariha Grace.  Malcolm Warner MD 2

## 2024-06-14 NOTE — H&P PST ADULT - AS O2 DELIVERY
-Keep your wound dressing clean, dry, and intact. Only change dressing if it's over saturated, soiled or falls off.     -Change your dressing if it becomes soiled, soaked, or falls off.    -Should you experience any significant changes in your wound(s), such as infection (redness, swelling, localized heat, increased pain, fever > 101 F, chills) or have any questions regarding your home care instructions, please contact the wound center at [phone]. If after hours, contact your primary care physician or go to the hospital emergency room.     
room air

## 2025-05-20 NOTE — PATIENT PROFILE ADULT. - PRO MENTAL HEALTH SX RECENT
How Severe Are Your Spot(S)?: mild What Is The Reason For Today's Visit?: Full Body Skin Examination What Is The Reason For Today's Visit? (Being Monitored For X): concerning skin lesions on a periodic basis none